# Patient Record
Sex: FEMALE | Race: WHITE | Employment: FULL TIME | URBAN - NONMETROPOLITAN AREA
[De-identification: names, ages, dates, MRNs, and addresses within clinical notes are randomized per-mention and may not be internally consistent; named-entity substitution may affect disease eponyms.]

---

## 2017-08-31 ENCOUNTER — HOSPITAL ENCOUNTER (OUTPATIENT)
Dept: MRI IMAGING | Age: 32
Discharge: HOME | End: 2017-08-31
Payer: COMMERCIAL

## 2017-08-31 DIAGNOSIS — M93.90 OSTEOCHONDROPATHY: ICD-10-CM

## 2017-08-31 PROCEDURE — 73721 MRI JNT OF LWR EXTRE W/O DYE: CPT

## 2017-09-27 RX ORDER — LETROZOLE 2.5 MG/1
2.5 TABLET, FILM COATED ORAL DAILY
Status: ON HOLD | COMMUNITY
End: 2021-01-18

## 2017-10-04 ENCOUNTER — HOSPITAL ENCOUNTER (OUTPATIENT)
Age: 32
Setting detail: OUTPATIENT SURGERY
Discharge: HOME | End: 2017-10-04
Attending: PODIATRIST | Admitting: PODIATRIST
Payer: COMMERCIAL

## 2017-10-04 ENCOUNTER — ANESTHESIA (OUTPATIENT)
Dept: OPERATING ROOM | Age: 32
End: 2017-10-04
Payer: COMMERCIAL

## 2017-10-04 ENCOUNTER — ANESTHESIA EVENT (OUTPATIENT)
Dept: OPERATING ROOM | Age: 32
End: 2017-10-04
Payer: COMMERCIAL

## 2017-10-04 VITALS
OXYGEN SATURATION: 97 % | HEIGHT: 70 IN | SYSTOLIC BLOOD PRESSURE: 108 MMHG | WEIGHT: 160 LBS | TEMPERATURE: 98 F | RESPIRATION RATE: 10 BRPM | BODY MASS INDEX: 22.9 KG/M2 | DIASTOLIC BLOOD PRESSURE: 70 MMHG | HEART RATE: 72 BPM

## 2017-10-04 VITALS — SYSTOLIC BLOOD PRESSURE: 118 MMHG | TEMPERATURE: 98.6 F | DIASTOLIC BLOOD PRESSURE: 71 MMHG | OXYGEN SATURATION: 97 %

## 2017-10-04 LAB — PREGNANCY, URINE: NEGATIVE

## 2017-10-04 PROCEDURE — 3700000001 HC ADD 15 MINUTES (ANESTHESIA): Performed by: PODIATRIST

## 2017-10-04 PROCEDURE — 6370000000 HC RX 637 (ALT 250 FOR IP): Performed by: STUDENT IN AN ORGANIZED HEALTH CARE EDUCATION/TRAINING PROGRAM

## 2017-10-04 PROCEDURE — 6360000002 HC RX W HCPCS: Performed by: NURSE ANESTHETIST, CERTIFIED REGISTERED

## 2017-10-04 PROCEDURE — 6360000002 HC RX W HCPCS: Performed by: ANESTHESIOLOGY

## 2017-10-04 PROCEDURE — 2780000010 HC IMPLANT OTHER: Performed by: PODIATRIST

## 2017-10-04 PROCEDURE — 3700000000 HC ANESTHESIA ATTENDED CARE: Performed by: PODIATRIST

## 2017-10-04 PROCEDURE — 7100000000 HC PACU RECOVERY - FIRST 15 MIN: Performed by: PODIATRIST

## 2017-10-04 PROCEDURE — 7100000010 HC PHASE II RECOVERY - FIRST 15 MIN: Performed by: PODIATRIST

## 2017-10-04 PROCEDURE — 7100000011 HC PHASE II RECOVERY - ADDTL 15 MIN: Performed by: PODIATRIST

## 2017-10-04 PROCEDURE — 81025 URINE PREGNANCY TEST: CPT

## 2017-10-04 PROCEDURE — 2500000003 HC RX 250 WO HCPCS: Performed by: NURSE ANESTHETIST, CERTIFIED REGISTERED

## 2017-10-04 PROCEDURE — 2500000003 HC RX 250 WO HCPCS: Performed by: PODIATRIST

## 2017-10-04 PROCEDURE — 6370000000 HC RX 637 (ALT 250 FOR IP): Performed by: NURSE ANESTHETIST, CERTIFIED REGISTERED

## 2017-10-04 PROCEDURE — 3600000014 HC SURGERY LEVEL 4 ADDTL 15MIN: Performed by: PODIATRIST

## 2017-10-04 PROCEDURE — 3600000004 HC SURGERY LEVEL 4 BASE: Performed by: PODIATRIST

## 2017-10-04 PROCEDURE — 7100000001 HC PACU RECOVERY - ADDTL 15 MIN: Performed by: PODIATRIST

## 2017-10-04 PROCEDURE — 2580000003 HC RX 258: Performed by: NURSE ANESTHETIST, CERTIFIED REGISTERED

## 2017-10-04 PROCEDURE — A6446 CONFORM BAND S W>=3" <5"/YD: HCPCS | Performed by: PODIATRIST

## 2017-10-04 PROCEDURE — A6222 GAUZE <=16 IN NO W/SAL W/O B: HCPCS | Performed by: PODIATRIST

## 2017-10-04 DEVICE — RT3 SURGICAL HEMOSTAT
Type: IMPLANTABLE DEVICE | Site: LEG | Status: FUNCTIONAL
Brand: VITAGEL

## 2017-10-04 RX ORDER — LIDOCAINE HYDROCHLORIDE 20 MG/ML
INJECTION, SOLUTION INFILTRATION; PERINEURAL PRN
Status: DISCONTINUED | OUTPATIENT
Start: 2017-10-04 | End: 2017-10-04 | Stop reason: SDUPTHER

## 2017-10-04 RX ORDER — SCOLOPAMINE TRANSDERMAL SYSTEM 1 MG/1
PATCH, EXTENDED RELEASE TRANSDERMAL PRN
Status: DISCONTINUED | OUTPATIENT
Start: 2017-10-04 | End: 2017-10-04 | Stop reason: SDUPTHER

## 2017-10-04 RX ORDER — ONDANSETRON 2 MG/ML
4 INJECTION INTRAMUSCULAR; INTRAVENOUS EVERY 6 HOURS PRN
Status: DISCONTINUED | OUTPATIENT
Start: 2017-10-04 | End: 2017-10-04 | Stop reason: HOSPADM

## 2017-10-04 RX ORDER — SODIUM CHLORIDE 0.9 % (FLUSH) 0.9 %
10 SYRINGE (ML) INJECTION EVERY 12 HOURS SCHEDULED
Status: DISCONTINUED | OUTPATIENT
Start: 2017-10-04 | End: 2017-10-04 | Stop reason: HOSPADM

## 2017-10-04 RX ORDER — OXYCODONE HYDROCHLORIDE AND ACETAMINOPHEN 5; 325 MG/1; MG/1
1 TABLET ORAL EVERY 4 HOURS PRN
Status: DISCONTINUED | OUTPATIENT
Start: 2017-10-04 | End: 2017-10-04 | Stop reason: HOSPADM

## 2017-10-04 RX ORDER — SODIUM CHLORIDE 0.9 % (FLUSH) 0.9 %
10 SYRINGE (ML) INJECTION PRN
Status: DISCONTINUED | OUTPATIENT
Start: 2017-10-04 | End: 2017-10-04 | Stop reason: HOSPADM

## 2017-10-04 RX ORDER — ACETAMINOPHEN 325 MG/1
650 TABLET ORAL EVERY 4 HOURS PRN
Status: DISCONTINUED | OUTPATIENT
Start: 2017-10-04 | End: 2017-10-04 | Stop reason: HOSPADM

## 2017-10-04 RX ORDER — HYDRALAZINE HYDROCHLORIDE 20 MG/ML
5 INJECTION INTRAMUSCULAR; INTRAVENOUS EVERY 10 MIN PRN
Status: DISCONTINUED | OUTPATIENT
Start: 2017-10-04 | End: 2017-10-04 | Stop reason: HOSPADM

## 2017-10-04 RX ORDER — MEPERIDINE HYDROCHLORIDE 25 MG/ML
12.5 INJECTION INTRAMUSCULAR; INTRAVENOUS; SUBCUTANEOUS EVERY 5 MIN PRN
Status: DISCONTINUED | OUTPATIENT
Start: 2017-10-04 | End: 2017-10-04 | Stop reason: HOSPADM

## 2017-10-04 RX ORDER — ONDANSETRON 2 MG/ML
4 INJECTION INTRAMUSCULAR; INTRAVENOUS
Status: COMPLETED | OUTPATIENT
Start: 2017-10-04 | End: 2017-10-04

## 2017-10-04 RX ORDER — MORPHINE SULFATE 2 MG/ML
2 INJECTION, SOLUTION INTRAMUSCULAR; INTRAVENOUS EVERY 5 MIN PRN
Status: DISCONTINUED | OUTPATIENT
Start: 2017-10-04 | End: 2017-10-04 | Stop reason: HOSPADM

## 2017-10-04 RX ORDER — FENTANYL CITRATE 50 UG/ML
50 INJECTION, SOLUTION INTRAMUSCULAR; INTRAVENOUS EVERY 5 MIN PRN
Status: DISCONTINUED | OUTPATIENT
Start: 2017-10-04 | End: 2017-10-04 | Stop reason: HOSPADM

## 2017-10-04 RX ORDER — DEXAMETHASONE SODIUM PHOSPHATE 4 MG/ML
INJECTION, SOLUTION INTRA-ARTICULAR; INTRALESIONAL; INTRAMUSCULAR; INTRAVENOUS; SOFT TISSUE PRN
Status: DISCONTINUED | OUTPATIENT
Start: 2017-10-04 | End: 2017-10-04 | Stop reason: SDUPTHER

## 2017-10-04 RX ORDER — PROPOFOL 10 MG/ML
INJECTION, EMULSION INTRAVENOUS PRN
Status: DISCONTINUED | OUTPATIENT
Start: 2017-10-04 | End: 2017-10-04 | Stop reason: SDUPTHER

## 2017-10-04 RX ORDER — LABETALOL HYDROCHLORIDE 5 MG/ML
5 INJECTION, SOLUTION INTRAVENOUS EVERY 5 MIN PRN
Status: DISCONTINUED | OUTPATIENT
Start: 2017-10-04 | End: 2017-10-04 | Stop reason: HOSPADM

## 2017-10-04 RX ORDER — SODIUM CHLORIDE 9 MG/ML
INJECTION, SOLUTION INTRAVENOUS CONTINUOUS PRN
Status: DISCONTINUED | OUTPATIENT
Start: 2017-10-04 | End: 2017-10-04 | Stop reason: SDUPTHER

## 2017-10-04 RX ORDER — BUPIVACAINE HYDROCHLORIDE AND EPINEPHRINE 5; 5 MG/ML; UG/ML
INJECTION, SOLUTION EPIDURAL; INTRACAUDAL; PERINEURAL PRN
Status: DISCONTINUED | OUTPATIENT
Start: 2017-10-04 | End: 2017-10-04 | Stop reason: HOSPADM

## 2017-10-04 RX ORDER — DIPHENHYDRAMINE HYDROCHLORIDE 50 MG/ML
12.5 INJECTION INTRAMUSCULAR; INTRAVENOUS
Status: DISCONTINUED | OUTPATIENT
Start: 2017-10-04 | End: 2017-10-04 | Stop reason: HOSPADM

## 2017-10-04 RX ORDER — OXYCODONE HYDROCHLORIDE AND ACETAMINOPHEN 5; 325 MG/1; MG/1
2 TABLET ORAL EVERY 4 HOURS PRN
Status: DISCONTINUED | OUTPATIENT
Start: 2017-10-04 | End: 2017-10-04 | Stop reason: HOSPADM

## 2017-10-04 RX ORDER — FENTANYL CITRATE 50 UG/ML
INJECTION, SOLUTION INTRAMUSCULAR; INTRAVENOUS PRN
Status: DISCONTINUED | OUTPATIENT
Start: 2017-10-04 | End: 2017-10-04 | Stop reason: SDUPTHER

## 2017-10-04 RX ADMIN — LIDOCAINE HYDROCHLORIDE 60 MG: 20 INJECTION, SOLUTION INFILTRATION; PERINEURAL at 07:32

## 2017-10-04 RX ADMIN — DEXAMETHASONE SODIUM PHOSPHATE 8 MG: 4 INJECTION, SOLUTION INTRAMUSCULAR; INTRAVENOUS at 07:32

## 2017-10-04 RX ADMIN — SODIUM CHLORIDE: 9 INJECTION, SOLUTION INTRAVENOUS at 07:30

## 2017-10-04 RX ADMIN — SCOPALAMINE 1 PATCH: 1 PATCH, EXTENDED RELEASE TRANSDERMAL at 07:30

## 2017-10-04 RX ADMIN — OXYCODONE HYDROCHLORIDE AND ACETAMINOPHEN 1 TABLET: 5; 325 TABLET ORAL at 10:01

## 2017-10-04 RX ADMIN — FENTANYL CITRATE 100 MCG: 50 INJECTION INTRAMUSCULAR; INTRAVENOUS at 07:32

## 2017-10-04 RX ADMIN — PROPOFOL 200 MG: 10 INJECTION, EMULSION INTRAVENOUS at 07:32

## 2017-10-04 RX ADMIN — ONDANSETRON 4 MG: 2 INJECTION INTRAMUSCULAR; INTRAVENOUS at 08:59

## 2017-10-04 ASSESSMENT — PULMONARY FUNCTION TESTS
PIF_VALUE: 8
PIF_VALUE: 8
PIF_VALUE: 14
PIF_VALUE: 10
PIF_VALUE: 6
PIF_VALUE: 0
PIF_VALUE: 9
PIF_VALUE: 6
PIF_VALUE: 5
PIF_VALUE: 8
PIF_VALUE: 17
PIF_VALUE: 12
PIF_VALUE: 17
PIF_VALUE: 8
PIF_VALUE: 7
PIF_VALUE: 8
PIF_VALUE: 5
PIF_VALUE: 4
PIF_VALUE: 1
PIF_VALUE: 12
PIF_VALUE: 5
PIF_VALUE: 9
PIF_VALUE: 4
PIF_VALUE: 10
PIF_VALUE: 6
PIF_VALUE: 12
PIF_VALUE: 7
PIF_VALUE: 7
PIF_VALUE: 4
PIF_VALUE: 4
PIF_VALUE: 14
PIF_VALUE: 9
PIF_VALUE: 3
PIF_VALUE: 9
PIF_VALUE: 0
PIF_VALUE: 4
PIF_VALUE: 5
PIF_VALUE: 4
PIF_VALUE: 4
PIF_VALUE: 10
PIF_VALUE: 5
PIF_VALUE: 14
PIF_VALUE: 8
PIF_VALUE: 4
PIF_VALUE: 4
PIF_VALUE: 19
PIF_VALUE: 8
PIF_VALUE: 2
PIF_VALUE: 5
PIF_VALUE: 7
PIF_VALUE: 9
PIF_VALUE: 6
PIF_VALUE: 2
PIF_VALUE: 8
PIF_VALUE: 4
PIF_VALUE: 4
PIF_VALUE: 15
PIF_VALUE: 4
PIF_VALUE: 6
PIF_VALUE: 9
PIF_VALUE: 4
PIF_VALUE: 4
PIF_VALUE: 7
PIF_VALUE: 3
PIF_VALUE: 4
PIF_VALUE: 4
PIF_VALUE: 3
PIF_VALUE: 10
PIF_VALUE: 7
PIF_VALUE: 7
PIF_VALUE: 3
PIF_VALUE: 4
PIF_VALUE: 14
PIF_VALUE: 14
PIF_VALUE: 5
PIF_VALUE: 3
PIF_VALUE: 13
PIF_VALUE: 3
PIF_VALUE: 14
PIF_VALUE: 14
PIF_VALUE: 1
PIF_VALUE: 14
PIF_VALUE: 13
PIF_VALUE: 4
PIF_VALUE: 10
PIF_VALUE: 4
PIF_VALUE: 7
PIF_VALUE: 3
PIF_VALUE: 4
PIF_VALUE: 0
PIF_VALUE: 4
PIF_VALUE: 1
PIF_VALUE: 4
PIF_VALUE: 7
PIF_VALUE: 7
PIF_VALUE: 3
PIF_VALUE: 6
PIF_VALUE: 7

## 2017-10-04 ASSESSMENT — PAIN - FUNCTIONAL ASSESSMENT: PAIN_FUNCTIONAL_ASSESSMENT: 0-10

## 2017-10-04 ASSESSMENT — PAIN DESCRIPTION - DESCRIPTORS: DESCRIPTORS: ACHING;SHARP

## 2017-10-04 ASSESSMENT — PAIN SCALES - GENERAL
PAINLEVEL_OUTOF10: 0
PAINLEVEL_OUTOF10: 6
PAINLEVEL_OUTOF10: 0

## 2017-10-04 NOTE — IP AVS SNAPSHOT
Patient Information     Patient Name KELIN Portillo 1985      SEDATION/ANALGESIA INFORMATION/HOME GOING ADVICE     SEDATION / ANALGESIA INFORMATION / Haritha Lenin 86 have received the sedation/analgesia medication during your visit    Sedation/analgesia is used during short medical procedures under controlled supervision. The medication will produce a strong relaxation. You will be able to hear, speak and follow instructions, but your memory and alertness will be decreased. You will be able to swallow and breathe on your own. During sedation/analgesia your blood pressure, heart and breathing will be watched closely. After the procedure, you may not remember what was said or done. You may have the following effects from the medication. \" Drowsiness, dizziness, sleepiness or confusion. \" Difficulty remembering or delayed reaction times. \" Loss of fine muscle control or difficulty with your balance especially while walking. \" Difficulty focusing or blurred vision. You may not be aware of slight changes in your behavior and/or your reaction time because of the medication used during the procedure. Therefore you should follow these instructions. \" Have someone responsible help you with your care. \" Do not drive for 24 hours. \" Do not operate equipment for 24 hours (lawnmowers, power tools, kitchen accessories, stove). \" Do not drink any alcoholic beverages for a minimum of 24 hours. \" Do not make important personal, legal or business decisions for 24 hours. \" You may experience dizziness or lightheadedness. Move slowly and carefully, do not make sudden position changes. \" Drink extra amounts of fluids today. \" Increase your diet as tolerated (unless you have received specific instructions from your doctor). \" If you feel nauseated, continue with liquids until the nausea is gone. \" Notify your physician if you have not urinated within 8 hours after the procedure.

## 2017-10-04 NOTE — IP AVS SNAPSHOT
Patient Information     Patient Name KELIN Caicedo Crocker Jaydon Bergeron 1985         This is your updated medication list to keep with you all times      TAKE these medications     FEMARA 2.5 MG tablet   Generic drug:  letrozole       OVIDREL 250 MCG/0.5ML Inj   Generic drug:  Choriogonadotropin Jeremy

## 2017-10-04 NOTE — IP AVS SNAPSHOT
After Visit Summary  (Discharge Instructions)    Medication List for Home    Based on the information you provided to us as well as any changes during this visit, the following is your updated medication list.  Compare this with your prescription bottles at home. If you have any questions or concerns, contact your primary care physician's office. Daily Medication List (This medication list can be shared with any healthcare provider who is helping you manage your medications)      These are medications you told us you were taking at home, CONTINUE taking them after you leave the hospital        Last Dose    Next Dose Due AM NOON PM NIGHT    FEMARA 2.5 MG tablet   Generic drug:  letrozole   Take 2.5 mg by mouth daily 3 tabs daily x 7 days following menstrual cycle                                         OVIDREL 250 MCG/0.5ML Inj   Generic drug:  Choriogonadotropin Jeremy   Inject into the skin Day 14 of cycle                                                 Allergies as of 10/4/2017        Reactions    Other     Certain atb      Immunizations as of 10/4/2017     No immunizations on file. Last Vitals          Most Recent Value    Temp  98 °F (36.7 °C)    Pulse  77    Resp  16    BP  103/60         After Visit Summary    This summary was created for you. Thank you for entrusting your care to us. The following information includes details about your hospital/visit stay along with steps you should take to help with your recovery once you leave the hospital.  In this packet, you will find information about the topics listed below:    · Instructions about your medications including a list of your home medications  · A summary of your hospital visit  · Follow-up appointments once you have left the hospital  · Your care plan at home      You may receive a survey regarding the care you received during your stay. Your input is valuable to us.  We encourage you to complete and return your survey in the envelope provided. We hope you will choose us in the future for your healthcare needs. Patient Information     Patient Name KELIN Peter 1985      Care Provided at:     Name Address Phone       [address] [phone]            Your Visit    Here you will find information about your visit, including the reason for your visit. Please take this sheet with you when you visit your doctor or other health care provider in the future. It will help determine the best possible medical care for you at that time. If you have any questions once you leave the hospital, please call the department phone number listed below. Why you were here     Your primary diagnosis was:  Not on File      Visit Information     Date & Time Provider Department Dept. Phone    10/4/2017 Bob Mcmahon [phone]       Follow-up Appointments    Below is a list of your follow-up and future appointments. This may not be a complete list as you may have made appointments directly with providers that we are not aware of or your providers may have made some for you. Please call your providers to confirm appointments. It is important to keep your appointments. Please bring your current insurance card, photo ID, co-pay, and all medication bottles to your appointment. If self-pay, payment is expected at the time of service. Follow-up Information     Follow up with Ulises Yao DPM.    Specialty:  Podiatry    Why:  Keep follow up appointment with Dr. David Bowen as previously scheduled. Contact information:    [address]affi Son[address] Liz  [phone]        Preventive Care        Date Due    HIV screening is recommended for all people regardless of risk factors  aged 15-65 years at least once (lifetime) who have never been HIV tested.  2001 Tetanus Combination Vaccine (1 - Tdap) 5/4/2005    Pap Smear 5/4/2007    Yearly Flu Vaccine (1) 9/1/2017                 Care Plan Once You Return Home    This section includes instructions you will need to follow once you leave the hospital.  Your care team will discuss these with you, so you and those caring for you know how to best care for your health needs at home. This section may also include educational information about certain health topics that may be of help to you. Discharge Instructions       Post Op Instructions    Pt Name: Vikram Poe  Medical Record Number: [id number]  Today's Date: 10/4/2017    GENERAL ANESTHESIA OR SEDATION  1. Do not drive or operate hazardous machinery for 24 hours. 2. Do not make important business or personal decisions for 24 hours. 3. Do not drink alcoholic beverages or use tobacco for 24 hours. ACTIVITY INSTRUCTIONS:   Rest today. Resume light to normal activity tomorrow. You may ambulate as tolerated in your post op shoe/boot. No weight is to be placed on the operative limb. Use crutches, walker, Roll-a-bout as needed. Elevate the operative limb as much as possible to reduce swelling and discomfort for the first 72 hours      DIET INSTRUCTIONS:  Begin with clear liquids. If not nauseated, may increase to a low-fat diet when you desire. Regular diet as tolerated. Other:     MEDICATIONS  Prescription sent with you to be used as directed. Do not drink alcohol or drive while taking these medications. You may experience dizziness or drowsiness with these medications. You may also experience constipation which can be relieved with stool softners or laxatives. You may resume your daily prescription medication schedule unless otherwise specified. If taking 325mg Aspirin or other blood thinners such as Coumadin or Plavix, you may resume tomorrow, unless told otherwise.      WOUND/DRESSING INSTRUCTIONS: you do not sign up before the expiration date, you must request a new code. [employer] Access Code: Kirsten Best  Expires: 12/3/2017  9:22 AM    4. Enter your Social Security Number ([id number]) and Date of Birth (mm/dd/yyyy) as indicated and click Submit. You will be taken to the next sign-up page. 5. Create a [employer] ID. This will be your [employer] login ID and cannot be changed, so think of one that is secure and easy to remember. 6. Create a [employer] password. You can change your password at any time. 7. Enter your Password Reset Question and Answer. This can be used at a later time if you forget your password. 8. Enter your e-mail address. You will receive e-mail notification when new information is available in [address] 9. Click Sign Up. You can now view your medical record. Additional Information  If you have questions, please contact the physician practice where you receive care. Remember, [employer] is NOT to be used for urgent needs. For medical emergencies, dial 911. For questions regarding your [employer] account call [phone]. If you have a clinical question, please call your doctor's office. View your information online  ? Review your current list of  medications, immunization, and allergies. ? Review your future test results online . ? Review your discharge instructions provided by your caregivers at discharge    Certain functionality such as prescription refills, scheduling appointments or sending messages to your provider are not activated if your provider does not use [facility] in his/her office    For questions regarding your [employer] account call [phone]. If you have a clinical question, please call your doctor's office. The information on all pages of the After Visit Summary has been reviewed with me, the patient and/or responsible adult, by my health care provider(s).  I had the opportunity to ask questions regarding this

## 2017-10-04 NOTE — ANESTHESIA POSTPROCEDURE EVALUATION
Department of Anesthesiology  Postprocedure Note    Patient: Rosita Gilliland  MRN: [id number]  YOB: 1985  Date of evaluation: 10/4/2017  Time:  12:35 PM     Procedure Summary     Date Anesthesia Start Anesthesia Stop Room / Location    10/04/17 0730 0918 [facility] OR 01 / [place] OR       Procedure Diagnosis Surgeon Responsible Provider    ANKLE ARTHROSCOPY WITH ANTERIOR MEDIAL DEBRIDEMENT AND INSPECTION OF THE ANKLE ON THE LEFT WITH INJECTION OF GUSTAVO GEL (Left ) (ANTERIOR MEDIAL ANKLE JOINT IMPINGEMENT WITH POSSIBLE CARTILAGINOUS INJURY LEFT ANKLE) CHIRAG Rosa MD          Anesthesia Type: general    Joel Phase I: Joel Score: 10    Joel Phase II: Joel Score: 10    Last vitals:  BP      Temp      Pulse     Resp      SpO2        Anesthesia Post Evaluation   [facility]  POST-ANESTHESIA NOTE       Name:  Rosita Gilliland                                         Age:  33 y.o.   MRN:  [id number]      Last Vitals:  /70  Pulse 72  Temp 98 °F (36.7 °C) (Temporal)   Resp 10  Ht 5' 10\" (1.778 m)  Wt 160 lb (72.6 kg)  SpO2 97%  BMI 22.96 kg/m2  Patient Vitals for the past 4 hrs:   BP Temp Temp src Pulse Resp SpO2   10/04/17 0935 108/70 - - 72 10 97 %   10/04/17 0930 110/68 - - 77 16 97 %   10/04/17 0925 111/77 - - 92 18 100 %   10/04/17 0920 110/74 - - 83 12 98 %   10/04/17 0915 108/61 - - 101 25 96 %   10/04/17 0910 103/60 - - 72 16 96 %   10/04/17 0908 103/60 98 °F (36.7 °C) Temporal 77 16 96 %       Level of Consciousness:  Awake    Respiratory:  Stable    Oxygen Saturation:  Stable    Cardiovascular:  Stable    Hydration:  Adequate    PONV:  Stable    Post-op Pain:  Adequate analgesia    Post-op Assessment:  No apparent anesthetic complications    Additional Follow-Up / Treatment / Comment:  None    Desiree Blanton MD  October 4, 2017   12:35 PM

## 2017-10-04 NOTE — ANESTHESIA PRE PROCEDURE
Department of Anesthesiology  Preprocedure Note       Name:  Ellen Madsen   Age:  33 y.o.  :  1985                                          MRN:  [id number]         Date:  10/4/2017      Surgeon: Lauren Li):  Carla Pedroza DPM    Procedure: Procedure(s):  ANKLE ARTHROSCOPY WITH ANTERIOR MEDIAL DEBRIDEMENT AND INSPECTION OF THE ANKLE ON THE LEFT WITH INJECTION OF GUSTAVO GEL    Medications prior to admission:   Prior to Admission medications    Medication Sig Start Date End Date Taking? Authorizing Provider   letrozole (FEMARA) 2.5 MG tablet Take 2.5 mg by mouth daily 3 tabs daily x 7 days following menstrual cycle   Yes Historical Provider, MD   Choriogonadotropin Jeremy (OVIDREL) 250 MCG/0.5ML INJ Inject into the skin Day 14 of cycle    Historical Provider, MD       Current medications:    Current Facility-Administered Medications   Medication Dose Route Frequency Provider Last Rate Last Dose    sodium chloride flush 0.9 % injection 10 mL  10 mL Intravenous 2 times per day Ken Backers, DPM        sodium chloride flush 0.9 % injection 10 mL  10 mL Intravenous PRN Ekn Backers, DPNALINI        ceFAZolin (ANCEF) 2 g in dextrose 3 % 50 mL IVPB (duplex)  2 g Intravenous On Call to Arti [address], DPM           Allergies: Allergies   Allergen Reactions    Other      Certain atb       Problem List:  There is no problem list on file for this patient.       Past Medical History:        Diagnosis Date    PONV (postoperative nausea and vomiting)        Past Surgical History:        Procedure Laterality Date    NASAL FRACTURE SURGERY         Social History:    Social History   Substance Use Topics    Smoking status: Never Smoker    Smokeless tobacco: Not on file    Alcohol use Yes      Comment: occasionally                                Counseling given: Not Answered      Vital Signs (Current):   Vitals:    17 0941 10/04/17 0634   BP:  108/68   Pulse:  64   Resp:  16   Temp:  36 °C

## 2017-10-04 NOTE — PROGRESS NOTES
1649 To phase 1 recovery via cart. Oropharyngeal intact. Respirations are easy & non-labored. Skin warm and dry. Left lower leg dressing and post op shoe on. Dressing is clean, dry and intact. VS WNL - see flowsheet. Capillary refill < 3 seconds. 6550 Pt arousing to name. Oropharyngeal airway removed. Respirations are easy and non-labored. Pt denies pain or nausea. 5645 Continues to rest quietly in bed with HOB elevated to 30 degrees. Denies pain, nausea or needs at present. 0935 Continues to deny pain or nausea. Skin warm and dry. A & O x 3. Respirations are easy & non-labored. 0940 Moved to phase 2 recovery via cart. 46  to bedside. Pt given fadi mist and short bread cookies. Ice pack applied to back of left knee. Dressing and post op shoe left lower leg dry and intact. Skin warm and dry. Respirations are easy and non-labored. Continues to deny pain but states she is \"slightly nauseated\". Encouraged to take small sips of pop as tolerated. Voiced understanding. 0955 Pt able to eat and drink without problems. When asked about pain left lower ankle pt states it's an achy continuous pain 6/10. At first, pt refuses pain medication. Educated importance of pain control with use of rest, ice and medication. Voiced understanding. Agreeable to oral pain medication. See MAR for details. [address] discharge instructions with patient and spouse. Also given transderm [place] patch instructions. 1024 Dressing at bedside with 's assistance. 1035 Pain improved 3/10. Pt states she is still \"slightly nauseated\" but is able to drink 4 oz of pop and eat 4 cookies without problem. 1040 Skin warm and dry. Respirations are easy & non-labored. Left foot shoe/dressing dry and intact. A & O x 3.  To private vehicle via wheelchair x 1 assist.

## 2017-10-05 NOTE — OP NOTE
[address] [place] [address]                               OPERATIVE REPORT    PATIENT NAME:  Paola Doty                  :             1985  MED REC NO:   [id number]                            ROOM:  ACCOUNT NO:   [de-identified]                            ADMISSION DATE:  10/04/2017  PROVIDER:     Jon Hale    DATE OF PROCEDURE:  10/04/2017    SURGEON:  Jory Olmos D.P.M. ASSISTANT:  Dr. Jon Hale, PGY-1    PREOPERATIVE DIAGNOSES:  Anteromedial ankle joint impingement, left  ankle; contusion of left ankle sequelae. POSTOPERATIVE DIAGNOSES:  Anteromedial ankle joint impingement, left  ankle; contusion of left ankle sequelae; osteochondral lesion to left  ankle. OPERATION PERFORMED:  1. Ankle arthroscopy with anteromedial debridement and inspection of  left ankle joint. 2.  Injection of Vitagel. ANESTHESIA:  General.    HEMOSTASIS:  Anatomic dissection. ESTIMATED BLOOD LOSS:  Less than 50 mL. MATERIALS:  4-0 Prolene. INJECTABLES:  25 mL of 0.5% Marcaine with epinephrine and Vitagel into  the ankle joint. PATHOLOGY/MICROBIOLOGY:  None. COMPLICATIONS:  None. CONDITION:  Good. FINDINGS:  Consistent with diagnosis, osteochondral lesion noted along  the anterior medial aspect of the tibial ankle gutter. INDICATIONS:  The patient is a 36-year-old female with chief complaint  of anterior ankle pain, who is being seen by Dr. Deshaun Baeza and treated  for anterior ankle impingement. At this time, all conservative  options have been exhausted and surgical intervention was deemed  necessary. The procedure was described to the patient and they  expressed understanding of risks, benefits, and possible  complications. Possible complications include bone infection,  continued pain, possible loss of limb, possible loss of life.     OPERATIVE PROCEDURE:  The patient was transported from the  preoperative holding area into the operating room and placed in a  supine position. A pneumatic tourniquet was applied to the left  thigh, and left foot and ankle was prepped and draped in normal  aseptic manner. Attention was then directed towards the left ankle,  careful palpation was performed to identify the ankle joint as well as  the medial and lateral ankle borders. Skin marker was used to place  the incision site medial to the anterior tibial tendon as it crosses  the ankle joint. Another incision was planned over the lateral ankle  gutter with care to avoid the superficial dorsal cutaneous nerve. A  15-blade was then used to make a skin excision over the planned areas. Before making incision, 10 mL of 0.5% Marcaine was injected into the  ankle joint. Following incision, blunt dissection was performed using  hemostat down to the level of the ankle joint. The arthroscope was  then placed into the lateral ankle portal.  Following the insertion of  scope, attention was then directed towards the medial arthroscopic  portal and a shaver was inserted into the anteromedial portal.  The  ankle joint was then closely inspected. Arthroscopy was noted to  have significant amount of synovitis along the medial ankle joint as  well as a loose body which resembled an osteochondral lesion along the  dorsal aspect of the medial ankle gutter. The shaver was then used to  carefully debride the synovitis. A blunt probe was then used to  carefully inspect a particular surface of the tibia as well as talus. No soft cartilaginous defect were noted; however, there was a defect  noted along the dorsal medial aspect of the tibial gutter. Attention  was then directed towards osteochondral lesion, which was then  carefully debrided using the shaver. The shaver was then removed and  a tissue ablator was then used to continue to ablate the inflammatory tissue.   Upon debridement and ablation, ankle joint was then again

## 2017-11-21 ENCOUNTER — HOSPITAL ENCOUNTER (OUTPATIENT)
Dept: PHYSICAL THERAPY | Age: 32
Setting detail: THERAPIES SERIES
Discharge: HOME | End: 2017-11-21
Payer: COMMERCIAL

## 2017-11-21 PROCEDURE — 97161 PT EVAL LOW COMPLEX 20 MIN: CPT

## 2017-11-21 PROCEDURE — 97110 THERAPEUTIC EXERCISES: CPT

## 2017-11-21 ASSESSMENT — PAIN DESCRIPTION - ORIENTATION: ORIENTATION: LEFT

## 2017-11-21 ASSESSMENT — PAIN DESCRIPTION - PAIN TYPE: TYPE: ACUTE PAIN

## 2017-11-21 ASSESSMENT — PAIN DESCRIPTION - DIRECTION: RADIATING_TOWARDS: MEDIAL ANKLE

## 2017-11-21 ASSESSMENT — PAIN DESCRIPTION - LOCATION: LOCATION: ANKLE

## 2017-11-21 ASSESSMENT — PAIN SCALES - GENERAL: PAINLEVEL_OUTOF10: 4

## 2017-11-22 NOTE — PROGRESS NOTES
medications. Subjective:  Chart Reviewed: Yes  Patient assessed for rehabilitation services?: Yes  Comments: Physician follow up 11/30/17  Other (Comment): PT eval and treat, 2-3 times per week for 6-8 weeks     Subjective: Patient sprained her ankle last March, XR negative at that time. In Oct 2017 Dr. [place] Massed the bone. \" Patient sprained her ankle while going for a layup in basketball and rolled her ankle inversion. She still c/o 4/10 pain in L ankle medial mallelous area. She does not have full motion of leg, walking is fine but difficulty with running, able to tolerate about a mile. Pain will occasionally radiate up tibia to shin. 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 Leisure & Hobbies: Running - used to work out 6 days per week, recently had decreased to 2-3 miles at a time. Crossfit/ interval training. Basketball coaching, [address].      Objective    Observation/Palpation  Palpation: L medial ankle   Observation: In standing - no shoes - increased arch height bilaterally, L ankle increased supination and internal rotation of L ankle compared to R  Scar: L ankle medial and lateral malleolus/ talotibial scars - sutures removed, healed well      RLE General AROM: PF 0-55 deg, PF 0-12 deg     LLE General AROM: PF 0-45 deg tight, DF 0-8 deg tight, INV 0-32 deg, EVR 0-33 deg     Strength RLE: WNL    Strength LLE: Exception  Comment: DF 4+/5, PF 4/5, Inv 4/5, EVR 4/5    Overall Sensation Status: Impaired (Some surface numbness over lateral incision)            Ambulation 1  Surface: carpet  Device: No Device  Assistance: Independent  Quality of Gait: Normalized gait mechanics, normal gait speed, mild antalgia LLE early lift off and decreased L heel strike  Distance: 200 ft about clinic      Stairs  # Steps : 8  Stairs Height: 6\"  Rails: None  Device: No Device  Assistance: Independent  Comment: Reciprocal pattern, no antalgia noted      Balance  Single Leg Stance R Leg: 15 (eyes closed )  Single Leg Stance L Leg: 15 (eyes closed, increased postural sway)           Exercises  Exercise 1: Seated gastroc stretch with strap, soleus stretch with knee flexed, 3 way ankle stretch. Exercise 2: Standing gastroc stretch heel hanging off edge of step 3x 30 sec  Exercise 3: Patient demonstrating several movements which are challenging for her - able to kneel on floor and sit back on heels for end range plantarflexion, full deep squat causes tight sharp pain in talocrural region  Exercise 4: Lunge forward x2 reps each LE - denies pain          Activity Tolerance:  Activity Tolerance: Patient Tolerated treatment well    Assessment: Body structures, Functions, Activity limitations: Decreased ROM, Decreased strength, Decreased balance, Decreased high-level IADLs  Assessment: Patient presents with L ankle pain and stiffness consistent with recent arthroscopy 10/4/2017. She has been trying to resume exercise and running 1 mile with medial ankle pain and stiffness. She would benefit from PT to improve ankle stability and flexibility in order to safely resume prior level of activity. Prognosis: Excellent       Patient Education:  Patient Education: Verbally reviewed HEP - 3 way gastroc stretch with strap, soleus stretch with strap.  Standing balance eyes closed    Plan:  Times per week: 2-3x/week  Plan weeks: 8 decreased L ankle pain to less than 2/10 in order to run at prior level  Short term goal 2: Patient will increase L ankle AROM to 0-12 deg DF, 0-55 deg PF, and 0-40 deg Inversion, 0-35 deg eversion in order to jump, squat, lunge at prior level. Short term goal 3: Patient will demonstrate 5/5 ankle strength with single limb heel raise x25 reps no pain. Short term goal 4: Patient will tolerate plyometric assessment such as jump, leaping, single limb hopping all no pain in order to tolerate interval training for exercise. Long term goals  Time Frame for Long term goals : 8 weeks  Long term goal 1: Patient will improve score of Lower Extremity Functional Index to less than 5% impairment (baseline 15% impairment) in order to exercise and run at prior level. Long term goal 2: Patient will tolerate agility tasks such as high knees, ladder drills without increased pain in order to progress running. Long term goal 3: Patient will tolerate running gait analysis on treadmill.           Ryland Morales, PT

## 2017-11-30 ENCOUNTER — HOSPITAL ENCOUNTER (OUTPATIENT)
Dept: PHYSICAL THERAPY | Age: 32
Setting detail: THERAPIES SERIES
Discharge: HOME | End: 2017-11-30
Payer: COMMERCIAL

## 2017-11-30 PROCEDURE — 97110 THERAPEUTIC EXERCISES: CPT

## 2017-11-30 PROCEDURE — 97035 APP MDLTY 1+ULTRASOUND EA 15: CPT

## 2017-11-30 NOTE — PROGRESS NOTES
[facility]  OUTPATIENT PHYSICAL THERAPY  DAILY NOTE  Madhav Miller     Time In: 0356  Time Out: 5553  Minutes: 34  Timed Code Treatment Minutes: 34 Minutes     Date: 2017  Patient Name: Yamileth Gardner,  Gender:  female        CSN: [id number]   : 1985  (33 y.o.)  Referral Date : 17    Referring Practitioner: Ned Hughes DPM      Diagnosis: Other enthesopathy of L foot M77.52, sprain L ankle S93.492D, contusion L ankle (S90.02XS), Osteochondropathy, L ankle and foot Mm93.972  Treatment Diagnosis: L ankle pain, L ankle stiffness, L ankle edema, muscular weakness. Additional Pertinent Hx: S/p Ankle arthroscopy 10/4/2017 with anterior medial debridedment and inspection of ankle on L with mark gel                  General:  PT Visit Information  Onset Date: 17  PT Insurance Information: [employer] - $25 co pay, allowed 20 visits PT  per carolee yr, Aquatic YES, modalities YES, no massage. MUST BE TREATED BY LICENSED PT/ OT ONLY. Total # of Visits Approved: 20  Total # of Visits to Date: 2  Plan of Care/Certification Expiration Date: 18  Progress Note Counter: 1/10 for PN               Subjective:  Chart Reviewed: Yes  Patient assessed for rehabilitation services?: Yes  Comments: Physician follow up 17  Other (Comment): PT eval and treat, 2-3 times per week for 6-8 weeks     Subjective: Patient sprained her ankle last March, XR negative at that time. In Oct 2017 Dr. Alvarado Hodgkin the bone. \" Patient sprained her ankle while going for a layup in basketball and rolled her ankle inversion. She still c/o 4/10 pain in L ankle medial mallelous area. She does not have full motion of leg, walking is fine but difficulty with running, able to tolerate about a mile. Pain will occasionally radiate up tibia to shin.       Pain:  Patient Currently in Pain: Yes         Objective                     Exercises  Exercise 1: Seated gastroc stretch with strap, x 15, soleus stretch with knee flexed,  Exercise 2: Standing gastroc stretch heel hanging off edge of step 3x 30 sec,  solues stretch with foot propped on step  3 x 15 sec     Exercise 3: rockerboard x 15 B, then balcne for 15 sec. BAPS FWB x 15 x 2 directions. Exercise 5: Us to anterior ankle join  1.2  8min continuous. Activity Tolerance:  Activity Tolerance: Patient Tolerated treatment well    Assessment:  Assessment: Pain anterior joint line with Weighted DF,  added US    Prognosis: Excellent       Patient Education:  Patient Education: try walking on treadmill with elevation to check compression of the anterior ankle joint,                       Plan:  Specific instructions for Next Treatment: trial of US due to soreness after exercise. PT ONLY for insurance. Stationary bike/ elliptical warm up. Ankle theraband, standing balance for stabilization. Return to plyometric interval training and running gait analysis on treadmill as tolerated. At initial eval, patient admits to running about 1 mile and performing leaping/jumping tasks. Goals:  Patient goals : Decrease pain in medial ankle, return to running 2-3 miles at a time, other fitness routines    Short term goals  Time Frame for Short term goals: 4 weeks  Short term goal 1: Patient will report decreased L ankle pain to less than 2/10 in order to run at prior level  Short term goal 2: Patient will increase L ankle AROM to 0-12 deg DF, 0-55 deg PF, and 0-40 deg Inversion, 0-35 deg eversion in order to jump, squat, lunge at prior level. Short term goal 3: Patient will demonstrate 5/5 ankle strength with single limb heel raise x25 reps no pain. Short term goal 4: Patient will tolerate plyometric assessment such as jump, leaping, single limb hopping all no pain in order to tolerate interval training for exercise.     Long term goals  Time Frame for Long term goals : 8 weeks  Long term goal 1: Patient will improve score of Lower Extremity Functional Index to less than 5% impairment (baseline 15% impairment) in order to exercise and run at prior level. Long term goal 2: Patient will tolerate agility tasks such as high knees, ladder drills without increased pain in order to progress running. Long term goal 3: Patient will tolerate running gait analysis on treadmill.           Shannon Tom, PT

## 2017-12-04 ENCOUNTER — HOSPITAL ENCOUNTER (OUTPATIENT)
Dept: PHYSICAL THERAPY | Age: 32
Setting detail: THERAPIES SERIES
Discharge: HOME | End: 2017-12-04
Payer: COMMERCIAL

## 2017-12-04 PROCEDURE — 97035 APP MDLTY 1+ULTRASOUND EA 15: CPT

## 2017-12-04 PROCEDURE — 97110 THERAPEUTIC EXERCISES: CPT

## 2017-12-07 ENCOUNTER — HOSPITAL ENCOUNTER (OUTPATIENT)
Dept: PHYSICAL THERAPY | Age: 32
Setting detail: THERAPIES SERIES
Discharge: HOME | End: 2017-12-07
Payer: COMMERCIAL

## 2017-12-07 PROCEDURE — 97110 THERAPEUTIC EXERCISES: CPT

## 2017-12-07 PROCEDURE — 97035 APP MDLTY 1+ULTRASOUND EA 15: CPT

## 2017-12-07 NOTE — PROGRESS NOTES
[facility]  OUTPATIENT PHYSICAL THERAPY  DAILY NOTE  Avinash Hall     Time In: 36  Time Out: 1700  Minutes: 30  Timed Code Treatment Minutes: 30 Minutes     Date: 2017  Patient Name: Kd Hartman,  Gender:  female        CSN: [id number]   : 1985  (33 y.o.)  Referral Date : 17    Referring Practitioner: Rubia Cage DPM      Diagnosis: Other enthesopathy of L foot M77.52, sprain L ankle S93.492D, contusion L ankle (S90.02XS), Osteochondropathy, L ankle and foot Mm93.972  Treatment Diagnosis: L ankle pain, L ankle stiffness, L ankle edema, muscular weakness. Additional Pertinent Hx: S/p Ankle arthroscopy 10/4/2017 with anterior medial debridedment and inspection of ankle on L with mark gel                  General:  PT Visit Information  Onset Date: 17  PT Insurance Information: [employer] - $25 co pay, allowed 20 visits PT  per carolee yr, Aquatic YES, modalities YES, no massage. MUST BE TREATED BY LICENSED PT/ OT ONLY. Total # of Visits Approved: 20  Total # of Visits to Date: 4  Plan of Care/Certification Expiration Date: 18               Subjective:  Chart Reviewed: Yes  Patient assessed for rehabilitation services?: Yes  Comments: Physician follow up 17  Other (Comment): PT eval and treat, 2-3 times per week for 6-8 weeks     Subjective: she has worked out doing burpees, lunges, squats without ankle pain, pain present with running only and along the anterior ankle joint     Pain:  Patient Currently in Pain: Yes         Objective         Exercises  Exercise 1: treadmill walking -- warm up for `3 min,, jog without shoe and with shoes. no pain with walking, but pain present with and wihtout shoes as soon as she intitiated running,   Exercise 2: Standing gastroc stretch heel hanging off edge of step 3x 30 sec,  solues stretch with foot propped on step  3 x 15 sec     Exercise 5: Us to anterior ankle join  1.2  8min continuous.  on stretch, manual stretching  joint mobs in all directions,  pain with anterior glide of TC joint,          Activity Tolerance:  Activity Tolerance: Patient Tolerated treatment well    Assessment: Body structures, Functions, Activity limitations: Decreased ROM, Decreased strength, Decreased balance, Decreased high-level IADLs  Assessment: Pain anterior joint line with Weighted DF with knee flexed, not preesnt with knee extended. ,added joint mobs with moderate pressure today,   Prognosis: Excellent       Patient Education:  Patient Education: contineu gastroc and soleus stretches frequently ,decreased heel stirke time B with running,             Plan:  Plan Comment: continue    Goals:  Patient goals : Decrease pain in medial ankle, return to running 2-3 miles at a time, other fitness routines    Short term goals  Time Frame for Short term goals: 4 weeks  Short term goal 1: Patient will report decreased L ankle pain to less than 2/10 in order to run at prior level  Short term goal 2: Patient will increase L ankle AROM to 0-12 deg DF, 0-55 deg PF, and 0-40 deg Inversion, 0-35 deg eversion in order to jump, squat, lunge at prior level. Short term goal 3: Patient will demonstrate 5/5 ankle strength with single limb heel raise x25 reps no pain. Short term goal 4: Patient will tolerate plyometric assessment such as jump, leaping, single limb hopping all no pain in order to tolerate interval training for exercise. Long term goals  Time Frame for Long term goals : 8 weeks  Long term goal 1: Patient will improve score of Lower Extremity Functional Index to less than 5% impairment (baseline 15% impairment) in order to exercise and run at prior level. Long term goal 2: Patient will tolerate agility tasks such as high knees, ladder drills without increased pain in order to progress running. Long term goal 3: Patient will tolerate running gait analysis on treadmill.           Aman Mendoza PT

## 2017-12-11 ENCOUNTER — HOSPITAL ENCOUNTER (OUTPATIENT)
Dept: PHYSICAL THERAPY | Age: 32
Setting detail: THERAPIES SERIES
Discharge: HOME | End: 2017-12-11
Payer: COMMERCIAL

## 2017-12-11 PROCEDURE — 97110 THERAPEUTIC EXERCISES: CPT

## 2017-12-11 PROCEDURE — 97035 APP MDLTY 1+ULTRASOUND EA 15: CPT

## 2017-12-11 NOTE — PROGRESS NOTES
[facility]  OUTPATIENT PHYSICAL THERAPY  DAILY NOTE  Ayaz Barrett     Time In: 5095  Time Out: 2173  Minutes: 31  Timed Code Treatment Minutes: 31 Minutes     Date: 2017  Patient Name: Timur King,  Gender:  female        CSN: [id number]   : 1985  (33 y.o.)  Referral Date : 17    Referring Practitioner: Janice Lao DPM      Diagnosis: Other enthesopathy of L foot M77.52, sprain L ankle S93.492D, contusion L ankle (S90.02XS), Osteochondropathy, L ankle and foot Mm93.972  Treatment Diagnosis: L ankle pain, L ankle stiffness, L ankle edema, muscular weakness. Additional Pertinent Hx: S/p Ankle arthroscopy 10/4/2017 with anterior medial debridedment and inspection of ankle on L with mark gel                  General:  PT Visit Information  Onset Date: 17  PT Insurance Information: [employer] - $25 co pay, allowed 20 visits PT  per carolee yr, Aquatic YES, modalities YES, no massage. MUST BE TREATED BY LICENSED PT/ OT ONLY.    Total # of Visits Approved: 20  Total # of Visits to Date: 5  Plan of Care/Certification Expiration Date: 18               Subjective:  Chart Reviewed: Yes  Patient assessed for rehabilitation services?: Yes  Comments: Physician follow up none scheduled yet    SHe may call -- he suggested cortisone injection if  no change with PT   Other (Comment): PT eval and treat, 2-3 times per week for 6-8 weeks     Subjective: She has attempte to run for short distance, with anterio joint pain with push off on the left   occassional twinges of anterior joint pain with walking,  releif with US and stretching temporary,       Pain:  Patient Currently in Pain: Yes         Objective                                                      Exercises  Exercise 1: Nu step  5 min,    Exercise 2: Standing gastroc stretch heel hanging off edge of step 3x 30 sec,  solues stretch with foot propped on step  3 x 15 sec     Exercise 3: rockerboard x 15 B, then (baseline 15% impairment) in order to exercise and run at prior level. Long term goal 2: Patient will tolerate agility tasks such as high knees, ladder drills without increased pain in order to progress running. Long term goal 3: Patient will tolerate running gait analysis on treadmill.           Meri Pryor, PT

## 2017-12-14 ENCOUNTER — APPOINTMENT (OUTPATIENT)
Dept: PHYSICAL THERAPY | Age: 32
End: 2017-12-14
Payer: COMMERCIAL

## 2017-12-18 ENCOUNTER — HOSPITAL ENCOUNTER (OUTPATIENT)
Dept: PHYSICAL THERAPY | Age: 32
Setting detail: THERAPIES SERIES
Discharge: HOME | End: 2017-12-18
Payer: COMMERCIAL

## 2017-12-18 PROCEDURE — 97035 APP MDLTY 1+ULTRASOUND EA 15: CPT

## 2017-12-27 ENCOUNTER — APPOINTMENT (OUTPATIENT)
Dept: PHYSICAL THERAPY | Age: 32
End: 2017-12-27
Payer: COMMERCIAL

## 2017-12-28 ENCOUNTER — HOSPITAL ENCOUNTER (OUTPATIENT)
Dept: PHYSICAL THERAPY | Age: 32
Setting detail: THERAPIES SERIES
End: 2017-12-28
Payer: COMMERCIAL

## 2018-01-04 ENCOUNTER — HOSPITAL ENCOUNTER (OUTPATIENT)
Dept: PHYSICAL THERAPY | Age: 33
Setting detail: THERAPIES SERIES
Discharge: HOME | End: 2018-01-04
Payer: COMMERCIAL

## 2018-01-04 PROCEDURE — 97110 THERAPEUTIC EXERCISES: CPT

## 2018-01-04 PROCEDURE — 97035 APP MDLTY 1+ULTRASOUND EA 15: CPT

## 2018-01-18 ENCOUNTER — HOSPITAL ENCOUNTER (OUTPATIENT)
Dept: PHYSICAL THERAPY | Age: 33
Setting detail: THERAPIES SERIES
Discharge: HOME | End: 2018-01-18
Payer: COMMERCIAL

## 2018-01-18 PROCEDURE — 97035 APP MDLTY 1+ULTRASOUND EA 15: CPT

## 2018-01-18 PROCEDURE — 97110 THERAPEUTIC EXERCISES: CPT

## 2019-03-25 ENCOUNTER — HOSPITAL ENCOUNTER (OUTPATIENT)
Age: 34
Discharge: HOME | End: 2019-03-25
Payer: COMMERCIAL

## 2019-03-25 ENCOUNTER — HOSPITAL ENCOUNTER (OUTPATIENT)
Dept: GENERAL RADIOLOGY | Age: 34
Discharge: HOME | End: 2019-03-25
Payer: COMMERCIAL

## 2019-03-25 DIAGNOSIS — M79.652 LEFT THIGH PAIN: ICD-10-CM

## 2019-03-25 PROCEDURE — 73552 X-RAY EXAM OF FEMUR 2/>: CPT

## 2021-01-18 ENCOUNTER — APPOINTMENT (OUTPATIENT)
Dept: LABOR AND DELIVERY | Age: 36
End: 2021-01-18
Payer: COMMERCIAL

## 2021-01-18 ENCOUNTER — HOSPITAL ENCOUNTER (INPATIENT)
Age: 36
LOS: 3 days | Discharge: HOME | End: 2021-01-21
Attending: OBSTETRICS & GYNECOLOGY | Admitting: OBSTETRICS & GYNECOLOGY
Payer: COMMERCIAL

## 2021-01-18 LAB
ABO: NORMAL
AMPHETAMINE+METHAMPHETAMINE URINE SCREEN: NEGATIVE
ANTIBODY SCREEN: NORMAL
BARBITURATE QUANTITATIVE URINE: NEGATIVE
BASOPHILS # BLD: 0.3 %
BASOPHILS ABSOLUTE: 0 THOU/MM3 (ref 0–0.1)
BENZODIAZEPINE QUANTITATIVE URINE: NEGATIVE
CANNABINOID QUANTITATIVE URINE: NEGATIVE
COCAINE METABOLITE QUANTITATIVE URINE: NEGATIVE
EOSINOPHIL # BLD: 0.4 %
EOSINOPHILS ABSOLUTE: 0 THOU/MM3 (ref 0–0.4)
ERYTHROCYTE [DISTWIDTH] IN BLOOD BY AUTOMATED COUNT: 12.9 % (ref 11.5–14.5)
ERYTHROCYTE [DISTWIDTH] IN BLOOD BY AUTOMATED COUNT: 45.5 FL (ref 35–45)
HCT VFR BLD CALC: 39 % (ref 37–47)
HEMOGLOBIN: 13.3 GM/DL (ref 12–16)
IMMATURE GRANS (ABS): 0.1 THOU/MM3 (ref 0–0.07)
IMMATURE GRANULOCYTES: 1 %
LYMPHOCYTES # BLD: 18.6 %
LYMPHOCYTES ABSOLUTE: 1.8 THOU/MM3 (ref 1–4.8)
MCH RBC QN AUTO: 33 PG (ref 26–33)
MCHC RBC AUTO-ENTMCNC: 34.1 GM/DL (ref 32.2–35.5)
MCV RBC AUTO: 96.8 FL (ref 81–99)
MONOCYTES # BLD: 7.3 %
MONOCYTES ABSOLUTE: 0.7 THOU/MM3 (ref 0.4–1.3)
NUCLEATED RED BLOOD CELLS: 0 /100 WBC
OPIATES, URINE: NEGATIVE
OXYCODONE: NEGATIVE
PHENCYCLIDINE QUANTITATIVE URINE: NEGATIVE
PLATELET # BLD: 178 THOU/MM3 (ref 130–400)
PMV BLD AUTO: 11.9 FL (ref 9.4–12.4)
RBC # BLD: 4.03 MILL/MM3 (ref 4.2–5.4)
RH FACTOR: NORMAL
SEG NEUTROPHILS: 72.4 %
SEGMENTED NEUTROPHILS ABSOLUTE COUNT: 7 THOU/MM3 (ref 1.8–7.7)
WBC # BLD: 9.6 THOU/MM3 (ref 4.8–10.8)

## 2021-01-18 PROCEDURE — 86901 BLOOD TYPING SEROLOGIC RH(D): CPT

## 2021-01-18 PROCEDURE — 6360000002 HC RX W HCPCS

## 2021-01-18 PROCEDURE — 85025 COMPLETE CBC W/AUTO DIFF WBC: CPT

## 2021-01-18 PROCEDURE — 1220000001 HC SEMI PRIVATE L&D R&B

## 2021-01-18 PROCEDURE — 36415 COLL VENOUS BLD VENIPUNCTURE: CPT

## 2021-01-18 PROCEDURE — 86592 SYPHILIS TEST NON-TREP QUAL: CPT

## 2021-01-18 PROCEDURE — 6370000000 HC RX 637 (ALT 250 FOR IP): Performed by: OBSTETRICS & GYNECOLOGY

## 2021-01-18 PROCEDURE — 80307 DRUG TEST PRSMV CHEM ANLYZR: CPT

## 2021-01-18 PROCEDURE — 6360000002 HC RX W HCPCS: Performed by: OBSTETRICS & GYNECOLOGY

## 2021-01-18 PROCEDURE — 86850 RBC ANTIBODY SCREEN: CPT

## 2021-01-18 PROCEDURE — 2580000003 HC RX 258: Performed by: OBSTETRICS & GYNECOLOGY

## 2021-01-18 PROCEDURE — 86900 BLOOD TYPING SEROLOGIC ABO: CPT

## 2021-01-18 RX ORDER — DOCUSATE SODIUM 100 MG/1
100 CAPSULE, LIQUID FILLED ORAL 2 TIMES DAILY
Status: DISCONTINUED | OUTPATIENT
Start: 2021-01-18 | End: 2021-01-19

## 2021-01-18 RX ORDER — CARBOPROST TROMETHAMINE 250 UG/ML
250 INJECTION, SOLUTION INTRAMUSCULAR PRN
Status: DISCONTINUED | OUTPATIENT
Start: 2021-01-18 | End: 2021-01-19

## 2021-01-18 RX ORDER — SODIUM CHLORIDE 0.9 % (FLUSH) 0.9 %
10 SYRINGE (ML) INJECTION PRN
Status: DISCONTINUED | OUTPATIENT
Start: 2021-01-18 | End: 2021-01-19

## 2021-01-18 RX ORDER — BUTORPHANOL TARTRATE 1 MG/ML
1 INJECTION, SOLUTION INTRAMUSCULAR; INTRAVENOUS
Status: DISCONTINUED | OUTPATIENT
Start: 2021-01-18 | End: 2021-01-19

## 2021-01-18 RX ORDER — ONDANSETRON 2 MG/ML
8 INJECTION INTRAMUSCULAR; INTRAVENOUS EVERY 8 HOURS PRN
Status: DISCONTINUED | OUTPATIENT
Start: 2021-01-18 | End: 2021-01-19

## 2021-01-18 RX ORDER — METHYLERGONOVINE MALEATE 0.2 MG/ML
200 INJECTION INTRAVENOUS PRN
Status: DISCONTINUED | OUTPATIENT
Start: 2021-01-18 | End: 2021-01-19

## 2021-01-18 RX ORDER — ACETAMINOPHEN 325 MG/1
650 TABLET ORAL EVERY 4 HOURS PRN
Status: DISCONTINUED | OUTPATIENT
Start: 2021-01-18 | End: 2021-01-19

## 2021-01-18 RX ORDER — SODIUM CHLORIDE, SODIUM LACTATE, POTASSIUM CHLORIDE, CALCIUM CHLORIDE 600; 310; 30; 20 MG/100ML; MG/100ML; MG/100ML; MG/100ML
INJECTION, SOLUTION INTRAVENOUS CONTINUOUS
Status: DISCONTINUED | OUTPATIENT
Start: 2021-01-18 | End: 2021-01-19

## 2021-01-18 RX ORDER — ZOLPIDEM TARTRATE 5 MG/1
5 TABLET ORAL NIGHTLY PRN
Status: DISCONTINUED | OUTPATIENT
Start: 2021-01-18 | End: 2021-01-19

## 2021-01-18 RX ORDER — SODIUM CHLORIDE 0.9 % (FLUSH) 0.9 %
10 SYRINGE (ML) INJECTION EVERY 12 HOURS SCHEDULED
Status: DISCONTINUED | OUTPATIENT
Start: 2021-01-18 | End: 2021-01-19

## 2021-01-18 RX ORDER — DIPHENHYDRAMINE HYDROCHLORIDE 50 MG/ML
25 INJECTION INTRAMUSCULAR; INTRAVENOUS EVERY 4 HOURS PRN
Status: DISCONTINUED | OUTPATIENT
Start: 2021-01-18 | End: 2021-01-19

## 2021-01-18 RX ADMIN — ONDANSETRON 8 MG: 2 INJECTION INTRAMUSCULAR; INTRAVENOUS at 22:45

## 2021-01-18 RX ADMIN — SODIUM CHLORIDE, POTASSIUM CHLORIDE, SODIUM LACTATE AND CALCIUM CHLORIDE: 600; 310; 30; 20 INJECTION, SOLUTION INTRAVENOUS at 18:30

## 2021-01-18 RX ADMIN — ZOLPIDEM TARTRATE 5 MG: 5 TABLET ORAL at 21:33

## 2021-01-18 RX ADMIN — Medication 1 MILLI-UNITS/MIN: at 20:13

## 2021-01-18 SDOH — HEALTH STABILITY: MENTAL HEALTH: HOW OFTEN DO YOU HAVE A DRINK CONTAINING ALCOHOL?: NEVER

## 2021-01-18 ASSESSMENT — PAIN DESCRIPTION - DESCRIPTORS: DESCRIPTORS: CRAMPING

## 2021-01-19 ENCOUNTER — ANESTHESIA (OUTPATIENT)
Dept: LABOR AND DELIVERY | Age: 36
End: 2021-01-19
Payer: COMMERCIAL

## 2021-01-19 ENCOUNTER — ANESTHESIA EVENT (OUTPATIENT)
Dept: LABOR AND DELIVERY | Age: 36
End: 2021-01-19
Payer: COMMERCIAL

## 2021-01-19 PROBLEM — Z34.90 SUPERVISION OF NORMAL PREGNANCY: Status: ACTIVE | Noted: 2021-01-19

## 2021-01-19 LAB — RPR: NONREACTIVE

## 2021-01-19 PROCEDURE — 2500000003 HC RX 250 WO HCPCS: Performed by: ANESTHESIOLOGY

## 2021-01-19 PROCEDURE — 7200000001 HC VAGINAL DELIVERY

## 2021-01-19 PROCEDURE — 6370000000 HC RX 637 (ALT 250 FOR IP): Performed by: OBSTETRICS & GYNECOLOGY

## 2021-01-19 PROCEDURE — 0KQM0ZZ REPAIR PERINEUM MUSCLE, OPEN APPROACH: ICD-10-PCS | Performed by: OBSTETRICS & GYNECOLOGY

## 2021-01-19 PROCEDURE — 6360000002 HC RX W HCPCS: Performed by: ANESTHESIOLOGY

## 2021-01-19 PROCEDURE — 3700000025 EPIDURAL BLOCK: Performed by: ANESTHESIOLOGY

## 2021-01-19 PROCEDURE — 6360000002 HC RX W HCPCS: Performed by: OBSTETRICS & GYNECOLOGY

## 2021-01-19 PROCEDURE — 10907ZC DRAINAGE OF AMNIOTIC FLUID, THERAPEUTIC FROM PRODUCTS OF CONCEPTION, VIA NATURAL OR ARTIFICIAL OPENING: ICD-10-PCS | Performed by: OBSTETRICS & GYNECOLOGY

## 2021-01-19 PROCEDURE — 1220000001 HC SEMI PRIVATE L&D R&B

## 2021-01-19 PROCEDURE — 3E033VJ INTRODUCTION OF OTHER HORMONE INTO PERIPHERAL VEIN, PERCUTANEOUS APPROACH: ICD-10-PCS | Performed by: OBSTETRICS & GYNECOLOGY

## 2021-01-19 PROCEDURE — 2580000003 HC RX 258: Performed by: OBSTETRICS & GYNECOLOGY

## 2021-01-19 RX ORDER — CARBOPROST TROMETHAMINE 250 UG/ML
250 INJECTION, SOLUTION INTRAMUSCULAR
Status: ACTIVE | OUTPATIENT
Start: 2021-01-19 | End: 2021-01-19

## 2021-01-19 RX ORDER — IBUPROFEN 800 MG/1
800 TABLET ORAL EVERY 8 HOURS SCHEDULED
Status: DISCONTINUED | OUTPATIENT
Start: 2021-01-20 | End: 2021-01-20 | Stop reason: SDUPTHER

## 2021-01-19 RX ORDER — DIPHENHYDRAMINE HCL 25 MG
25 TABLET ORAL EVERY 6 HOURS PRN
Status: DISCONTINUED | OUTPATIENT
Start: 2021-01-19 | End: 2021-01-19

## 2021-01-19 RX ORDER — ONDANSETRON 2 MG/ML
8 INJECTION INTRAMUSCULAR; INTRAVENOUS EVERY 8 HOURS PRN
Status: DISCONTINUED | OUTPATIENT
Start: 2021-01-19 | End: 2021-01-21 | Stop reason: HOSPADM

## 2021-01-19 RX ORDER — ACETAMINOPHEN 325 MG/1
650 TABLET ORAL EVERY 4 HOURS PRN
Status: DISCONTINUED | OUTPATIENT
Start: 2021-01-19 | End: 2021-01-21 | Stop reason: HOSPADM

## 2021-01-19 RX ORDER — MORPHINE SULFATE 2 MG/ML
2 INJECTION, SOLUTION INTRAMUSCULAR; INTRAVENOUS
Status: DISCONTINUED | OUTPATIENT
Start: 2021-01-19 | End: 2021-01-21 | Stop reason: HOSPADM

## 2021-01-19 RX ORDER — MORPHINE SULFATE 4 MG/ML
4 INJECTION, SOLUTION INTRAMUSCULAR; INTRAVENOUS
Status: DISCONTINUED | OUTPATIENT
Start: 2021-01-19 | End: 2021-01-21 | Stop reason: HOSPADM

## 2021-01-19 RX ORDER — ZOLPIDEM TARTRATE 10 MG/1
10 TABLET ORAL NIGHTLY PRN
Status: DISCONTINUED | OUTPATIENT
Start: 2021-01-20 | End: 2021-01-19

## 2021-01-19 RX ORDER — NALBUPHINE HCL 10 MG/ML
5 AMPUL (ML) INJECTION EVERY 4 HOURS PRN
Status: DISCONTINUED | OUTPATIENT
Start: 2021-01-19 | End: 2021-01-19

## 2021-01-19 RX ORDER — FENTANYL CITRATE 50 UG/ML
INJECTION, SOLUTION INTRAMUSCULAR; INTRAVENOUS PRN
Status: DISCONTINUED | OUTPATIENT
Start: 2021-01-19 | End: 2021-01-19 | Stop reason: SDUPTHER

## 2021-01-19 RX ORDER — ONDANSETRON 4 MG/1
4 TABLET, ORALLY DISINTEGRATING ORAL EVERY 6 HOURS PRN
Status: DISCONTINUED | OUTPATIENT
Start: 2021-01-19 | End: 2021-01-21 | Stop reason: HOSPADM

## 2021-01-19 RX ORDER — SODIUM CHLORIDE 0.9 % (FLUSH) 0.9 %
10 SYRINGE (ML) INJECTION PRN
Status: DISCONTINUED | OUTPATIENT
Start: 2021-01-19 | End: 2021-01-21 | Stop reason: HOSPADM

## 2021-01-19 RX ORDER — IBUPROFEN 800 MG/1
800 TABLET ORAL EVERY 8 HOURS
Status: DISCONTINUED | OUTPATIENT
Start: 2021-01-19 | End: 2021-01-21 | Stop reason: HOSPADM

## 2021-01-19 RX ORDER — NALOXONE HYDROCHLORIDE 0.4 MG/ML
0.4 INJECTION, SOLUTION INTRAMUSCULAR; INTRAVENOUS; SUBCUTANEOUS PRN
Status: DISCONTINUED | OUTPATIENT
Start: 2021-01-19 | End: 2021-01-19

## 2021-01-19 RX ORDER — ONDANSETRON 2 MG/ML
4 INJECTION INTRAMUSCULAR; INTRAVENOUS EVERY 6 HOURS PRN
Status: DISCONTINUED | OUTPATIENT
Start: 2021-01-19 | End: 2021-01-19

## 2021-01-19 RX ORDER — ONDANSETRON 4 MG/1
4 TABLET, ORALLY DISINTEGRATING ORAL EVERY 6 HOURS PRN
Status: DISCONTINUED | OUTPATIENT
Start: 2021-01-19 | End: 2021-01-19

## 2021-01-19 RX ORDER — METHYLERGONOVINE MALEATE 0.2 MG/ML
200 INJECTION INTRAVENOUS PRN
Status: DISCONTINUED | OUTPATIENT
Start: 2021-01-19 | End: 2021-01-21 | Stop reason: HOSPADM

## 2021-01-19 RX ORDER — ZOLPIDEM TARTRATE 10 MG/1
10 TABLET ORAL NIGHTLY PRN
Status: DISCONTINUED | OUTPATIENT
Start: 2021-01-20 | End: 2021-01-21 | Stop reason: HOSPADM

## 2021-01-19 RX ORDER — ROPIVACAINE HYDROCHLORIDE 2 MG/ML
INJECTION, SOLUTION EPIDURAL; INFILTRATION; PERINEURAL PRN
Status: DISCONTINUED | OUTPATIENT
Start: 2021-01-19 | End: 2021-01-19 | Stop reason: SDUPTHER

## 2021-01-19 RX ORDER — SODIUM CHLORIDE 0.9 % (FLUSH) 0.9 %
10 SYRINGE (ML) INJECTION EVERY 12 HOURS SCHEDULED
Status: DISCONTINUED | OUTPATIENT
Start: 2021-01-20 | End: 2021-01-20

## 2021-01-19 RX ORDER — HYDROCODONE BITARTRATE AND ACETAMINOPHEN 5; 325 MG/1; MG/1
2 TABLET ORAL EVERY 4 HOURS PRN
Status: DISCONTINUED | OUTPATIENT
Start: 2021-01-19 | End: 2021-01-21 | Stop reason: HOSPADM

## 2021-01-19 RX ORDER — DIPHENHYDRAMINE HCL 25 MG
25 TABLET ORAL EVERY 6 HOURS PRN
Status: DISCONTINUED | OUTPATIENT
Start: 2021-01-19 | End: 2021-01-21 | Stop reason: HOSPADM

## 2021-01-19 RX ORDER — MODIFIED LANOLIN
OINTMENT (GRAM) TOPICAL PRN
Status: DISCONTINUED | OUTPATIENT
Start: 2021-01-19 | End: 2021-01-21 | Stop reason: HOSPADM

## 2021-01-19 RX ORDER — FERROUS SULFATE 325(65) MG
325 TABLET ORAL
Status: DISCONTINUED | OUTPATIENT
Start: 2021-01-20 | End: 2021-01-21 | Stop reason: HOSPADM

## 2021-01-19 RX ORDER — DOCUSATE SODIUM 100 MG/1
100 CAPSULE, LIQUID FILLED ORAL 2 TIMES DAILY
Status: DISCONTINUED | OUTPATIENT
Start: 2021-01-20 | End: 2021-01-21 | Stop reason: HOSPADM

## 2021-01-19 RX ADMIN — FENTANYL CITRATE 100 MCG: 50 INJECTION, SOLUTION INTRAMUSCULAR; INTRAVENOUS at 19:30

## 2021-01-19 RX ADMIN — Medication 166.7 ML: at 21:27

## 2021-01-19 RX ADMIN — SODIUM CHLORIDE, POTASSIUM CHLORIDE, SODIUM LACTATE AND CALCIUM CHLORIDE: 600; 310; 30; 20 INJECTION, SOLUTION INTRAVENOUS at 19:30

## 2021-01-19 RX ADMIN — SODIUM CHLORIDE, POTASSIUM CHLORIDE, SODIUM LACTATE AND CALCIUM CHLORIDE: 600; 310; 30; 20 INJECTION, SOLUTION INTRAVENOUS at 11:57

## 2021-01-19 RX ADMIN — IBUPROFEN 800 MG: 800 TABLET, FILM COATED ORAL at 22:38

## 2021-01-19 RX ADMIN — SODIUM CHLORIDE, POTASSIUM CHLORIDE, SODIUM LACTATE AND CALCIUM CHLORIDE: 600; 310; 30; 20 INJECTION, SOLUTION INTRAVENOUS at 08:27

## 2021-01-19 RX ADMIN — Medication 18 ML/HR: at 07:42

## 2021-01-19 RX ADMIN — SODIUM CHLORIDE, POTASSIUM CHLORIDE, SODIUM LACTATE AND CALCIUM CHLORIDE: 600; 310; 30; 20 INJECTION, SOLUTION INTRAVENOUS at 01:41

## 2021-01-19 RX ADMIN — BUTORPHANOL TARTRATE 1 MG: 1 INJECTION, SOLUTION INTRAMUSCULAR; INTRAVENOUS at 03:03

## 2021-01-19 RX ADMIN — DIPHENHYDRAMINE HYDROCHLORIDE 25 MG: 50 INJECTION, SOLUTION INTRAMUSCULAR; INTRAVENOUS at 20:07

## 2021-01-19 RX ADMIN — SODIUM CHLORIDE, POTASSIUM CHLORIDE, SODIUM LACTATE AND CALCIUM CHLORIDE: 600; 310; 30; 20 INJECTION, SOLUTION INTRAVENOUS at 07:42

## 2021-01-19 RX ADMIN — ROPIVACAINE HYDROCHLORIDE 8 ML: 2 INJECTION, SOLUTION EPIDURAL; INFILTRATION at 19:30

## 2021-01-19 ASSESSMENT — PAIN DESCRIPTION - DESCRIPTORS: DESCRIPTORS: ACHING

## 2021-01-19 ASSESSMENT — PAIN SCALES - GENERAL: PAINLEVEL_OUTOF10: 5

## 2021-01-19 NOTE — PLAN OF CARE
Problem: Anxiety:  Goal: Level of anxiety will decrease  Description: Level of anxiety will decrease  1/19/2021 0824 by Cynthia Worley RN  Outcome: Ongoing   calm  Problem: Breathing Pattern - Ineffective:  Goal: Able to breathe comfortably  Description: Able to breathe comfortably  1/19/2021 0824 by Cynthia Worley RN  Outcome: Ongoing   Respirations regular and easy  Problem: Fluid Volume - Imbalance:  Goal: Absence of intrapartum hemorrhage signs and symptoms  Description: Absence of intrapartum hemorrhage signs and symptoms  1/19/2021 0824 by Cynthia Worley RN  Outcome: Ongoing   stable  Problem: Infection - Intrapartum Infection:  Goal: Will show no infection signs and symptoms  Description: Will show no infection signs and symptoms  1/19/2021 0824 by Cynthia Worley RN  Outcome: Ongoing   afebrile  Problem: Labor Process - Prolonged:  Goal: Uterine contractions within specified parameters  Description: Uterine contractions within specified parameters  1/19/2021 0824 by Cynthia Worley RN  Outcome: Ongoing   Contractions every 2 min  Problem: Pain - Acute:  Goal: Able to cope with pain  Description: Able to cope with pain  1/19/2021 0824 by Cynthia Worley RN  Outcome: Ongoing   Comfortable with epidural, pain goal 7  Problem: Tissue Perfusion - Uteroplacental, Altered:  Goal: Absence of abnormal fetal heart rate pattern  Description: Absence of abnormal fetal heart rate pattern  1/19/2021 0824 by Cynthia Worley RN  Outcome: Ongoing   Reactive fht's  Problem: Urinary Retention:  Goal: Urinary elimination within specified parameters  Description: Urinary elimination within specified parameters  1/19/2021 0824 by Cynthia Worley RN  Outcome: Ongoing   Bathroom priviliges  Problem: Falls - Risk of:  Goal: Will remain free from falls  Description: Will remain free from falls  1/19/2021 0824 by Cynthia Worley RN  Outcome: Ongoing   No falls  Problem: Discharge

## 2021-01-19 NOTE — FLOWSHEET NOTE
Dr jorge Archuleta in l/d and updated on late decels after epidural, occasional variable decel, moderate variability,4cm

## 2021-01-19 NOTE — FLOWSHEET NOTE
Dr Heather Christopher updated on pt's vag exam. Pt is in hands/knees now, baby not hitting her cervix well per RN.

## 2021-01-19 NOTE — ANESTHESIA PROCEDURE NOTES
Epidural Block    Patient location during procedure: OB  Reason for block: labor epidural  Staffing  Performed: resident/CRNA   Resident/CRNA: JUN Garay CRNA  Preanesthetic Checklist  Completed: patient identified, IV checked, site marked, risks and benefits discussed, surgical consent, monitors and equipment checked, pre-op evaluation, timeout performed, anesthesia consent given, oxygen available and patient being monitored  Epidural  Patient position: sitting  Prep: ChloraPrep  Patient monitoring: frequent blood pressure checks  Approach: midline  Location: lumbar (1-5)  Injection technique: GERBER air  Provider prep: mask and sterile gloves  Needle  Needle type: Tuohy   Needle gauge: 18 G  Needle length: 3.5 in  Needle insertion depth: 6 cm  Catheter type: side hole  Catheter at skin depth: 11 cm  Test dose: negative  Assessment  Hemodynamics: stable  Attempts: 1

## 2021-01-19 NOTE — ANESTHESIA PRE PROCEDURE
Department of Anesthesiology  Preprocedure Note       Name:  Brady White   Age:  30 y.o.  :  1985                                          MRN:  [id number]         Date:  2021      Surgeon: * No surgeons listed *    Procedure: * No procedures listed *    Medications prior to admission:   Prior to Admission medications    Medication Sig Start Date End Date Taking?  Authorizing Provider   Prenatal MV-Min-Fe Fum-FA-DHA (PRENATAL 1 PO) Take by mouth   Yes Historical Provider, MD       Current medications:    Current Facility-Administered Medications   Medication Dose Route Frequency Provider Last Rate Last Admin    oxytocin (PITOCIN) 30 units in 500 mL infusion  1 joanna-units/min Intravenous Continuous Christal Devlin MD 6 mL/hr at 21 0509 6 joanna-units/min at 21 0509    lactated ringers infusion   Intravenous Continuous Christal Devlin  mL/hr at 21 0141 New Bag at 21 0141    sodium chloride flush 0.9 % injection 10 mL  10 mL Intravenous 2 times per day Christal Devlin MD        sodium chloride flush 0.9 % injection 10 mL  10 mL Intravenous PRN Christal Devlin MD        butorphanol (STADOL) injection 1 mg  1 mg Intravenous Q3H PRN Christal Devlin MD   1 mg at 21 0303    ondansetron (ZOFRAN) injection 8 mg  8 mg Intravenous Q8H PRN Christal Devlin MD   8 mg at 21 2245    diphenhydrAMINE (BENADRYL) injection 25 mg  25 mg Intravenous Q4H PRN Christal Devlin MD        oxytocin (PITOCIN) 10 unit bolus from the bag  10 Units Intravenous PRN Christal Devlin MD        And    oxytocin (PITOCIN) 30 units in 500 mL infusion  50 joanna-units/min Intravenous PRN Christal Devlin MD        methylergonovine (METHERGINE) injection 200 mcg  200 mcg Intramuscular PRN Christal Devlin MD        carboprost (HEMABATE) injection 250 mcg  250 mcg Intramuscular PRN Christal Devlin MD  acetaminophen (TYLENOL) tablet 650 mg  650 mg Oral Q4H PRN Brandy Emery MD        witch hazel-glycerin (TUCKS) pad   Topical PRN Brandy Emery MD        benzocaine-menthol (DERMOPLAST) 20-0.5 % spray   Topical PRN Brandy Emery MD        docusate sodium (COLACE) capsule 100 mg  100 mg Oral BID Brandy Emery MD        zolpidem (AMBIEN) tablet 5 mg  5 mg Oral Nightly PRN Brandy Emery MD   5 mg at 01/18/21 4293       Allergies: Allergies   Allergen Reactions    Other      Certain atb       Problem List:  There is no problem list on file for this patient.       Past Medical History:        Diagnosis Date    PONV (postoperative nausea and vomiting)        Past Surgical History:        Procedure Laterality Date    ANKLE ARTHROSCOPY Left 10/04/2017    with anterior medial debridement with mark gel    ANKLE ARTHROSCOPY Left 10/4/2017    ANKLE ARTHROSCOPY WITH ANTERIOR MEDIAL DEBRIDEMENT AND INSPECTION OF THE ANKLE ON THE LEFT WITH INJECTION OF MARK GEL performed by Shyanne Yung DPM at [address] [facility]         Social History:    Social History     Tobacco Use    Smoking status: Never Smoker    Smokeless tobacco: Never Used   Substance Use Topics    Alcohol use: Never     Frequency: Never     Comment: occasionally                                Counseling given: Not Answered      Vital Signs (Current):   Vitals:    01/19/21 0500 01/19/21 0530 01/19/21 0600 01/19/21 0630   BP: (!) 90/52 110/72 120/71 124/77   Pulse: 75 71 79 75   Resp: 18  18    Temp:       TempSrc:       SpO2:       Weight:       Height:                                                  BP Readings from Last 3 Encounters:   01/19/21 124/77   10/04/17 108/70   10/04/17 118/71       NPO Status: Time of last liquid consumption: 1740                        Time of last solid consumption: 1500                        Date of last liquid consumption: 01/18/21 Date of last solid food consumption: 01/18/21    BMI:   Wt Readings from Last 3 Encounters:   01/18/21 205 lb (93 kg)   10/04/17 160 lb (72.6 kg)     Body mass index is 29.41 kg/m². CBC:   Lab Results   Component Value Date    WBC 9.6 01/18/2021    RBC 4.03 01/18/2021    RBC 4.10 06/24/2020    HGB 13.3 01/18/2021    HCT 39.0 01/18/2021    MCV 96.8 01/18/2021    RDW 13.4 06/24/2020     01/18/2021       CMP:   Lab Results   Component Value Date     03/25/2019    K 4.6 03/25/2019     03/25/2019    CO2 27 03/25/2019    BUN 16 03/25/2019    CREATININE 0.7 03/25/2019    GLUCOSE 92 03/25/2019    PROT 7.3 03/25/2019    CALCIUM 9.9 03/25/2019    BILITOT 0.5 03/25/2019    ALKPHOS 41 03/25/2019    AST 22 03/25/2019    ALT 15 03/25/2019       POC Tests: No results for input(s): POCGLU, POCNA, POCK, POCCL, POCBUN, POCHEMO, POCHCT in the last 72 hours. Coags: No results found for: PROTIME, INR, APTT    HCG (If Applicable):   Lab Results   Component Value Date    PREGTESTUR negative 10/04/2017    PREGSERUM NEGATIVE 12/12/2014        ABGs: No results found for: PHART, PO2ART, IFL6QPC, LEQ7UPP, BEART, U3XDHZNA     Type & Screen (If Applicable):  Lab Results   Component Value Date    LABABO A 06/24/2020    [address] POS 01/18/2021       Drug/Infectious Status (If Applicable):  No results found for: HIV, HEPCAB    COVID-19 Screening (If Applicable): No results found for: COVID19      Anesthesia Evaluation  Patient summary reviewed and Nursing notes reviewed   history of anesthetic complications: PONV.   Airway: Mallampati: II  TM distance: >3 FB   Neck ROM: full  Mouth opening: > = 3 FB Dental: normal exam         Pulmonary:Negative Pulmonary ROS                              Cardiovascular:  Exercise tolerance: good (>4 METS),                     Neuro/Psych:   Negative Neuro/Psych ROS              GI/Hepatic/Renal: Neg GI/Hepatic/Renal ROS            Endo/Other: Negative Endo/Other ROS Abdominal:           Vascular: negative vascular ROS. Anesthesia Plan      epidural     ASA 2             Anesthetic plan and risks discussed with patient. Plan discussed with attending.                   Lynne Wise, APRN - CRNA   1/19/2021

## 2021-01-19 NOTE — PLAN OF CARE
Problem: Anxiety:  Goal: Level of anxiety will decrease  Description: Level of anxiety will decrease  Note: Pt remains calm about the birthing experience,  at bedside, supportive. All questions/concerns addressed by RN. Problem: Breathing Pattern - Ineffective:  Goal: Able to breathe comfortably  Description: Able to breathe comfortably  Note: No signs of resp distress noted. Sp02 remains greater than 92% on room air. Respirations equal and unlabored. Problem: Fluid Volume - Imbalance:  Goal: Absence of intrapartum hemorrhage signs and symptoms  Description: Absence of intrapartum hemorrhage signs and symptoms  Note: No vaginal bleeding noted, will continue to monitor. Problem: Infection - Intrapartum Infection:  Goal: Will show no infection signs and symptoms  Description: Will show no infection signs and symptoms  Note: Vitals stable, pt remains afebrile. FHT's remain reassuring, will continue to monitor. Problem: Labor Process - Prolonged:  Goal: Uterine contractions within specified parameters  Description: Uterine contractions within specified parameters  Note: Paula and Pitocin induction started to achieve adequate labor. Problem: Pain - Acute:  Goal: Able to cope with pain  Description: Able to cope with pain  Note: Pt wanting epidural when in active labor, pain goal 7/10       Problem: Tissue Perfusion - Uteroplacental, Altered:  Goal: Absence of abnormal fetal heart rate pattern  Description: Absence of abnormal fetal heart rate pattern  Note: Fetal Heart Tones remain reassuring. Continuous EFM in place.         Problem: Urinary Retention:  Goal: Urinary elimination within specified parameters  Description: Urinary elimination within specified parameters  Note: Pt voiding sufficiently; will continue to montior       Problem: Falls - Risk of:  Goal: Will remain free from falls  Description: Will remain free from falls  Note: Pt to remain from injuries/fall with IV in place, walkway will remain free of clutter. Problem: Discharge Planning:  Goal: Discharged to appropriate level of care  Description: Discharged to appropriate level of care  Note: Pt aware of 2hr recovery period in L&D and then transfer to mom/baby for the remainder of her stay. Care plan reviewed with patient and Lien Louis. Patient and Lien Louis verbalize understanding of the plan of care and contribute to goal setting.

## 2021-01-20 LAB
HCT VFR BLD CALC: 31.7 % (ref 37–47)
HEMOGLOBIN: 10.5 GM/DL (ref 12–16)

## 2021-01-20 PROCEDURE — 1200000000 HC SEMI PRIVATE

## 2021-01-20 PROCEDURE — 85014 HEMATOCRIT: CPT

## 2021-01-20 PROCEDURE — 6370000000 HC RX 637 (ALT 250 FOR IP): Performed by: OBSTETRICS & GYNECOLOGY

## 2021-01-20 PROCEDURE — 36415 COLL VENOUS BLD VENIPUNCTURE: CPT

## 2021-01-20 PROCEDURE — 85018 HEMOGLOBIN: CPT

## 2021-01-20 RX ADMIN — ACETAMINOPHEN 650 MG: 325 TABLET ORAL at 20:47

## 2021-01-20 RX ADMIN — ACETAMINOPHEN 650 MG: 325 TABLET ORAL at 00:45

## 2021-01-20 RX ADMIN — ACETAMINOPHEN 650 MG: 325 TABLET ORAL at 14:06

## 2021-01-20 RX ADMIN — DOCUSATE SODIUM 100 MG: 100 CAPSULE, LIQUID FILLED ORAL at 08:05

## 2021-01-20 RX ADMIN — IBUPROFEN 800 MG: 800 TABLET, FILM COATED ORAL at 20:49

## 2021-01-20 RX ADMIN — IBUPROFEN 800 MG: 800 TABLET, FILM COATED ORAL at 08:05

## 2021-01-20 RX ADMIN — ACETAMINOPHEN 650 MG: 325 TABLET ORAL at 04:38

## 2021-01-20 ASSESSMENT — PAIN SCALES - GENERAL: PAINLEVEL_OUTOF10: 5

## 2021-01-20 NOTE — DISCHARGE SUMMARY
Vaginal Delivery Discharge Summary    Gestational Age:40w0d    Antepartum complications: none    Type of Delivery: Vaginal     Labs: CBC   Lab Results   Component Value Date    HGB 10.5 (L) 01/20/2021    HCT 31.7 (L) 01/20/2021        Intrapartum complications: None    Postpartum complications: none    The patient is ambulating well. The patient is tolerating a normal diet.     Condition: Stable    Discharge Date: 01/21/21    Plan:   Follow up in 5 week(s)    Edgar Watters MD

## 2021-01-20 NOTE — FLOWSHEET NOTE
Pt up to bathroom with assist for second time to void a large amount without difficulty. Moderate amount of rubra lochia. Pt instructed on pam care, voiced understanding. Pt returned to bed and fundus firm and U/U. Tolerated well. Pt denied needs at this time.

## 2021-01-20 NOTE — PLAN OF CARE
Problem: Discharge Planning:  Goal: Discharged to appropriate level of care  Description: Discharged to appropriate level of care  Outcome: Ongoing  Note: Working towards discharge home with family; needs addressed with mother; ducks in a row discussed        Problem: Constipation:  Goal: Bowel elimination is within specified parameters  Description: Bowel elimination is within specified parameters  Outcome: Ongoing  Note: Increasing fluids and ambulation. Problem: Fluid Volume - Imbalance:  Goal: Absence of postpartum hemorrhage signs and symptoms  Description: Absence of postpartum hemorrhage signs and symptoms  Outcome: Ongoing  Note: Small amount of lochia rubra noted. Vitals stable. Problem: Infection - Risk of, Puerperal Infection:  Goal: Will show no infection signs and symptoms  Description: Will show no infection signs and symptoms  Outcome: Ongoing  Note: Vital WNL; no s/sx of infection noted       Problem: Mood - Altered:  Goal: Mood stable  Description: Mood stable  Outcome: Ongoing  Note: Calm and cooperative; bonding well with infant       Problem: Pain - Acute:  Goal: Pain level will decrease  Description: Pain level will decrease  Outcome: Ongoing  Note: Managing pain with Motrin, Tylenol and ice packs; Maintaining pain within pain goal of 5/10 with interventions      Plan of care discussed with mother and she contributes to goal setting and voices understanding of plan of care.

## 2021-01-20 NOTE — FLOWSHEET NOTE
Infant has roomed in with mother this shift except for Mom to rest. Benefits of rooming in discussed.

## 2021-01-20 NOTE — ANESTHESIA POSTPROCEDURE EVALUATION
Department of Anesthesiology  Postprocedure Note    Patient: Damian Schwab  MRN: [id number]  YOB: 1985  Date of evaluation: 1/20/2021  Time:  7:58 AM     Procedure Summary     Date: 01/19/21 Room / Location:     Anesthesia Start: 1826 Anesthesia Stop: 2121    Procedure: Labor Analgesia Diagnosis:     Scheduled Providers:  Responsible Provider: Alejandro Min DO    Anesthesia Type: epidural ASA Status: 2          Anesthesia Type: epidural    Joel Phase I: Joel Score: 9    Joel Phase II: Joel Score: 10    Last vitals: Reviewed and per EMR flowsheets.        Anesthesia Post Evaluation    Patient location during evaluation: floor  Patient participation: complete - patient participated  Level of consciousness: awake and alert  Airway patency: patent  Nausea & Vomiting: no nausea and no vomiting  Complications: no  Cardiovascular status: hemodynamically stable  Respiratory status: acceptable and spontaneous ventilation  Hydration status: euvolemic

## 2021-01-20 NOTE — PLAN OF CARE
Problem: Discharge Planning:  Goal: Discharged to appropriate level of care  Description: Discharged to appropriate level of care  1/20/2021 1018 by Deo Morillo RN  Outcome: Ongoing  Note: On the Maternity Unit for care and medication     Problem: Constipation:  Goal: Bowel elimination is within specified parameters  Description: Bowel elimination is within specified parameters  1/20/2021 1018 by Deo Morillo RN  Outcome: Ongoing  Note: Passing gas, Colace given po     Problem: Fluid Volume - Imbalance:  Goal: Absence of postpartum hemorrhage signs and symptoms  Description: Absence of postpartum hemorrhage signs and symptoms  1/20/2021 1018 by Deo Morillo RN  Outcome: Ongoing  Note: Moderate amount of red lochia fundus is firm and centered     Problem: Infection - Risk of, Puerperal Infection:  Goal: Will show no infection signs and symptoms  Description: Will show no infection signs and symptoms  1/20/2021 1018 by Deo Morillo RN  Outcome: Ongoing  Note: V/S WNL< no untoward s/sr eported     Problem: Mood - Altered:  Goal: Mood stable  Description: Mood stable  1/20/2021 1018 by Deo Morillo RN  Outcome: Ongoing  Note: Pleasant     Problem: Pain - Acute:  Goal: Pain level will decrease  Description: Pain level will decrease  1/20/2021 1018 by Deo Morillo RN  Outcome: Ongoing  Note: Pain level is \"5\", pain goal is \"5\". Patient takes Tylenol ad Motrin for pain, voiced with relief   Care plan reviewed with patient and verbalized understanding. Patient contributed to goal setting.

## 2021-01-20 NOTE — PROGRESS NOTES
Department of Obstetrics and Gynecology  Labor and Delivery  Attending Post Partum Progress Note    SUBJECTIVE:  PPD# 1    OBJECTIVE: no co's     Vitals:  /68   Pulse 61   Temp 97.9 °F (36.6 °C) (Oral)   Resp 16   Ht 5' 10\" (1.778 m)   Wt 205 lb (93 kg)   SpO2 99%   Breastfeeding Unknown   BMI 29.41 kg/m²     ABDOMEN:  normal bowel sounds    DATA:    Hemoglobin:   Lab Results   Component Value Date    HGB 10.5 01/20/2021    HCT 31.7 01/20/2021       ASSESSMENT & PLAN:  Reynaldo Hyatt MD

## 2021-01-20 NOTE — FLOWSHEET NOTE
Pt up to bathroom x1 assist. Pt did own pericare with instruction. Pt to mom/baby via wheelchair and report given to [employer].

## 2021-01-21 VITALS
SYSTOLIC BLOOD PRESSURE: 137 MMHG | HEART RATE: 75 BPM | HEIGHT: 70 IN | BODY MASS INDEX: 29.35 KG/M2 | WEIGHT: 205 LBS | TEMPERATURE: 98.1 F | OXYGEN SATURATION: 99 % | RESPIRATION RATE: 18 BRPM | DIASTOLIC BLOOD PRESSURE: 86 MMHG

## 2021-01-21 PROCEDURE — 90471 IMMUNIZATION ADMIN: CPT | Performed by: OBSTETRICS & GYNECOLOGY

## 2021-01-21 PROCEDURE — 6360000002 HC RX W HCPCS: Performed by: OBSTETRICS & GYNECOLOGY

## 2021-01-21 PROCEDURE — 90715 TDAP VACCINE 7 YRS/> IM: CPT | Performed by: OBSTETRICS & GYNECOLOGY

## 2021-01-21 PROCEDURE — 6370000000 HC RX 637 (ALT 250 FOR IP): Performed by: OBSTETRICS & GYNECOLOGY

## 2021-01-21 RX ADMIN — IBUPROFEN 800 MG: 800 TABLET, FILM COATED ORAL at 20:46

## 2021-01-21 RX ADMIN — TETANUS TOXOID, REDUCED DIPHTHERIA TOXOID AND ACELLULAR PERTUSSIS VACCINE, ADSORBED 0.5 ML: 5; 2.5; 8; 8; 2.5 SUSPENSION INTRAMUSCULAR at 16:29

## 2021-01-21 RX ADMIN — ACETAMINOPHEN 650 MG: 325 TABLET ORAL at 00:41

## 2021-01-21 RX ADMIN — IBUPROFEN 800 MG: 800 TABLET, FILM COATED ORAL at 09:06

## 2021-01-21 RX ADMIN — ACETAMINOPHEN 650 MG: 325 TABLET ORAL at 11:39

## 2021-01-21 ASSESSMENT — PAIN SCALES - GENERAL
PAINLEVEL_OUTOF10: 3
PAINLEVEL_OUTOF10: 1
PAINLEVEL_OUTOF10: 3
PAINLEVEL_OUTOF10: 4

## 2021-01-21 NOTE — LACTATION NOTE
Upon observation possible short lingual frenulum noted. Explained to patient signs and symptoms of improper milk transfer. Discussed proper tongue movement and proper comfort of latch. Educated that [employer] can not diagnose a short lingual frenulum and provided patient with physician handout for further evaluation.

## 2021-01-21 NOTE — FLOWSHEET NOTE
Postpartum education brochure given, teaching complete. [place] postpartum depression screening discussed with patient. Patient instructed to complete [place] postpartum depression screening in 2 weeks and contact her healthcare provider if her score is > 10. Patient voiced understanding. Reviewed postpartum birth warning signs flyer with patient. Patient has voiced understanding of teaching. Discharge teaching and instructions for diagnosis/procedure of vaginal delivey completed with patient using teachback method. AVS reviewed. Patient voiced understanding regarding prescriptions, follow up appointments, and care of self at home. Pt instructed on perineal care and self administration of perineal meds. Pt verbalized understanding and may self medicate. Mother's blood type is A+.  Mom refused offer of Flu Vaccine

## 2021-01-21 NOTE — PLAN OF CARE
Problem: Discharge Planning:  Goal: Discharged to appropriate level of care  Description: Discharged to appropriate level of care  1/21/2021 0954 by Satish Lee RN  Outcome: Ongoing  Note: Home going instructions given to Mom and voiced understanding     Problem: Constipation:  Goal: Bowel elimination is within specified parameters  Description: Bowel elimination is within specified parameters  1/21/2021 0954 by Satish Lee RN  Outcome: Ongoing  Note: Passing gas, refused Colace     Problem: Fluid Volume - Imbalance:  Goal: Absence of postpartum hemorrhage signs and symptoms  Description: Absence of postpartum hemorrhage signs and symptoms  1/21/2021 0954 by Satish Lee RN  Outcome: Ongoing  Note: Small amount of red lochia fundus is firm and centered     Problem: Infection - Risk of, Puerperal Infection:  Goal: Will show no infection signs and symptoms  Description: Will show no infection signs and symptoms  1/21/2021 0954 by Satish Lee RN  Outcome: Ongoing  Note: V/S WNL, no untoward s/s reported     Problem: Mood - Altered:  Goal: Mood stable  Description: Mood stable  1/21/2021 0954 by Satish Lee RN  Outcome: Ongoing  Note: Pleasant     Problem: Pain - Acute:  Goal: Pain level will decrease  Description: Pain level will decrease  1/21/2021 0954 by Satish Lee RN  Outcome: Ongoing  Note: Pain rated at \"3\", pain goal is \"5\". Pt takes Tylenol and Motrin, voiced with relief   Care plan reviewed with patient and verbalized understanding. Patient contributed to goal setting.

## 2021-01-21 NOTE — PLAN OF CARE
Problem: Discharge Planning:  Goal: Discharged to appropriate level of care  Description: Discharged to appropriate level of care  1/20/2021 2132 by Chilo Moseley RN  Outcome: Ongoing  Note: Remains in hospital, discussed possible discharge needs. Problem: Constipation:  Goal: Bowel elimination is within specified parameters  Description: Bowel elimination is within specified parameters  1/20/2021 2132 by Chilo Moseley RN  Outcome: Ongoing  Note: Pt with active bowel sounds and passing gas     Problem: Fluid Volume - Imbalance:  Goal: Absence of postpartum hemorrhage signs and symptoms  Description: Absence of postpartum hemorrhage signs and symptoms  1/20/2021 2132 by Chilo Moseley RN  Outcome: Ongoing  Note: No signs of postpartum hemorrhage  at this time     Problem: Infection - Risk of, Puerperal Infection:  Goal: Will show no infection signs and symptoms  Description: Will show no infection signs and symptoms  1/20/2021 2132 by Chilo Moseley RN  Outcome: Ongoing  Note: No signs of infection at this time      Problem: Mood - Altered:  Goal: Mood stable  Description: Mood stable  1/20/2021 2132 by Chilo Moseley RN  Outcome: Ongoing  Note: Pt appropriate with infant family and this RN     Problem: Pain - Acute:  Goal: Pain level will decrease  Description: Pain level will decrease  1/20/2021 2132 by Chilo Moseley RN  Outcome: Ongoing  Note: Pain controlled with po meds. Discussed ice for perineal pain and/or incisional pain or the use of warm blanket/heating pad for uterine cramps. Pt states her pain goal 5/10 has been met. Care plan reviewed with patient and she contributes to goal setting and voices understanding of plan of care.

## 2021-01-22 NOTE — FLOWSHEET NOTE
Pt discharged to sisters room at this time. Pt verbalized understanding of prior education. Placenta release form signed and placenta given to pt.

## 2022-01-13 ENCOUNTER — HOSPITAL ENCOUNTER (OUTPATIENT)
Dept: ULTRASOUND IMAGING | Age: 37
Discharge: HOME | End: 2022-01-13
Payer: COMMERCIAL

## 2022-01-13 DIAGNOSIS — R10.11 ABDOMINAL PAIN, RIGHT UPPER QUADRANT: ICD-10-CM

## 2022-01-13 PROCEDURE — 76705 ECHO EXAM OF ABDOMEN: CPT

## 2022-04-27 ENCOUNTER — HOSPITAL ENCOUNTER (OUTPATIENT)
Dept: GENERAL RADIOLOGY | Age: 37
Discharge: HOME | End: 2022-04-27
Payer: COMMERCIAL

## 2022-04-27 DIAGNOSIS — R13.10 DYSPHAGIA, UNSPECIFIED TYPE: ICD-10-CM

## 2022-04-27 PROCEDURE — 2500000003 HC RX 250 WO HCPCS: Performed by: FAMILY MEDICINE

## 2022-04-27 PROCEDURE — 74230 X-RAY XM SWLNG FUNCJ C+: CPT

## 2022-04-27 PROCEDURE — 92611 MOTION FLUOROSCOPY/SWALLOW: CPT | Performed by: SPEECH-LANGUAGE PATHOLOGIST

## 2022-04-27 RX ADMIN — BARIUM SULFATE 10 ML: 400 PASTE ORAL at 08:21

## 2022-04-27 RX ADMIN — BARIUM SULFATE 20 ML: 0.81 POWDER, FOR SUSPENSION ORAL at 08:21

## 2022-04-27 NOTE — DISCHARGE SUMMARY
[address] [facility] RADIOLOGY  Modified Barium Swallow    SLP Individual Minutes  Time In: 3990  Time Out: 0830  Minutes: 27  Timed Code Treatment Minutes: 0 Minutes       Date: 2022  Patient Name: Yong Birch      CSN: [id number]   : 1985  (29 y.o.)  Gender: female   Referring Physician:  Dr. Tavia Howell  Diagnosis: dysphagia R13.10  History of Present Illness/Injury: Patient reports she has had weight loss and hair loss since 2021. Reports she had a baby in 2021 and she lost 50 pounds (10 pounds more than her prior baby weight) since then. Reports she had thyroid testing that showed a goiter on the right side. Patient reports that she has had some throat swelling for the past 3-4 weeks. Patient states, \"I swallow things fine, but it feels tight or swollen. \"  Patient reports she feels like she has more mucus/drainage this morning causing her to clear her throat more. Patient denies any history of pneumonia. MBS to assess current swallow function for safest diet recommendation. Patient  has a past medical history of PONV (postoperative nausea and vomiting). Current Diet: Regular with thin liquids    Pain: No pain reported. SUBJECTIVE:  Patient sitting upright in the radiology suite chair throughout testing. Patient cooperative and pleasant. No family present. OBJECTIVE:    Respiratory Status:  Room Air    Behavioral Observation:  Alert and Oriented    PATIENT WAS EVALUATED USING:  Barium: Thin Liquids, Puree, Soft Solids and Coarse Solids    ORAL PREPARATION PHASE:  WFL    ORAL PHASE: Reduced Bolus Control and Uncontrolled Bolus/Diffuse Fall Over Tongue Base     ORAL PHASE DUKE SCORE: (Dysphagia outcome and severity scale)  6 = WFL/Modified Independent - Normal Diet - May have mild oral delay    PHARYNGEAL PHASE:  Impaired: Delayed Swallow     PHARYNGEAL PHASE DUKE SCORE: (Dysphagia outcome and severity scale)  5 = Mild Dysphagia - may need one consistency restricted - May have one or more of the following: Aspiration with thin - cough to clear, Airway penetration midway to the vocal cords with one or more consistency or to the vocal folds with one consistency, but clears spontaneously - Residue in the pharynx clears spontaneously    EVIDENCE FOR LARYNGEAL PENETRATION AND/OR ASPIRATION:  No evidence of aspiration  Inconsistent laryngeal penetration evident with thin liquids via cup and straw, but clears with completion of the swallow. **Note: Patient cleared her throat intermittently throughout this study. PENETRATION-ASPIRATION SCALE (PAS): Thin Liquids: 2 = Material enters the airway, remains above vocal folds, and is ejected from the airway  Puree:  1 = Material does not enter the airway  Soft Solid:  1 = Material does not enter the airway  Hard Solid: 1 = Material does not enter the airway    ESOPHAGEAL PHASE:   Slight occasional residue in the upper esophagus. ATTEMPTED TECHNIQUES:  Small Bolus Size Effective    Straw Effective    Cup Effective    Chin Tuck Not Attempted    Head Turn Not Attempted    Spoon Presentations Not Attempted with liquids    Volitional Cough Not Attempted    Spontaneous Cough Not Attempted           DIAGNOSTIC IMPRESSIONS:  Patient's oral phase of the swallow presents at a level that is modified independent. Patient with decreased bolus control at times, however, no airway invasion noted as a result of it. Spillage of the mixed consistency bolus to the level of the pyriform sinuses preswallow and all other consistencies spill to the level of the valleculae preswallow. Patient presents with mild pharyngeal phase deficits characterized by a mildly delayed swallow resulting in occasional laryngeal penetration with thin liquids, which clears with completion of the swallow. No aspiration evident throughout this study.     Diet Recommendations:  Regular with thin liquids  Strategies:  Full Upright Position, Small Bite/Sip, Limit Distractions and slow rate     EDUCATION:  Learner: Patient  Education:  Reviewed results and recommendations of this evaluation, Reviewed diet and strategies and Reviewed signs, symptoms and risks of aspiration  Evaluation of Education: Verbalizes understanding and Family not present    PLAN:  No further speech therapy services indicated at this time.       Catalina Westfall M.S. [id number] [id number]

## 2022-10-24 ENCOUNTER — HOSPITAL ENCOUNTER (OUTPATIENT)
Dept: ULTRASOUND IMAGING | Age: 37
Discharge: HOME | End: 2022-10-24
Payer: COMMERCIAL

## 2022-10-24 DIAGNOSIS — E04.1 RIGHT THYROID NODULE: ICD-10-CM

## 2022-10-24 PROCEDURE — 76536 US EXAM OF HEAD AND NECK: CPT

## (undated) DEVICE — GOWN,SIRUS,NON REINFRCD,LARGE,SET IN SL: Brand: MEDLINE

## (undated) DEVICE — SUTURE VCRL SZ 3-0 L27IN ABSRB UD FS-2 L19MM 1/2 CIR J423H

## (undated) DEVICE — GLOVE ORANGE PI 7 1/2   MSG9075

## (undated) DEVICE — BANDAGE COMPR W4INXL12FT ESMARCH

## (undated) DEVICE — GAUZE,SPONGE,4"X4",12PLY,STERILE,LF,2'S: Brand: MEDLINE

## (undated) DEVICE — BANDAGE GZ W45INXL4 1 10YD FLUF RL 6 PLY DERMACEA

## (undated) DEVICE — 3M™ WARMING BLANKET, UPPER BODY, 10 PER CASE, 42268: Brand: BAIR HUGGER™

## (undated) DEVICE — IMPREGNATED GAUZE DRESSING: Brand: CUTICERIN 7.5X7.5CM CTN 50

## (undated) DEVICE — Z INACTIVE USE 2660664 SOLUTION IRRIG 3000ML 0.9% SOD CHL USP UROMATIC PLAS CONT

## (undated) DEVICE — DISCONTINUED NO SUB CS PLASMA SEPARATOR VITAPREP

## (undated) DEVICE — INTENDED FOR TISSUE SEPARATION, AND OTHER PROCEDURES THAT REQUIRE A SHARP SURGICAL BLADE TO PUNCTURE OR CUT.: Brand: BARD-PARKER ® CARBON RIB-BACK BLADES

## (undated) DEVICE — SUTURE NONABSORBABLE MONOFILAMENT 4-0 PS-2 18 IN BLU PROLENE 8682H

## (undated) DEVICE — KNEE ARTHROSCOPY V-LF: Brand: MEDLINE INDUSTRIES, INC.

## (undated) DEVICE — [ARTHROSCOPY PUMP,  DO NOT USE IF PACKAGE IS DAMAGED,  KEEP DRY,  KEEP AWAY FROM SUNLIGHT,  PROTECT FROM HEAT AND RADIOACTIVE SOURCES.]: Brand: FLOSTEADY

## (undated) DEVICE — GLOVE ORANGE PI 8   MSG9080

## (undated) DEVICE — GAUZE,SPONGE,8"X4",12PLY,XRAY,STRL,LF: Brand: MEDLINE

## (undated) DEVICE — BANDAGE COMPR W4INXL55YD 1 LAYR COT CLSR DLX E HVY DUTY W

## (undated) DEVICE — TOWEL,OR,DSP,ST,BLUE,DLX,4/PK,20PK/CS: Brand: MEDLINE

## (undated) DEVICE — SET ADMIN PRIMING 7ML L30IN 7.35LB 20 GTT 2ND RLER CLMP

## (undated) DEVICE — OPES ASPIRATING ABLATOR COOL CUT

## (undated) DEVICE — SURE SET SINGLE BASIN-LF: Brand: MEDLINE INDUSTRIES, INC.

## (undated) DEVICE — SPONGE GZ W4XL4IN COT 12 PLY TYP VII WVN C FLD DSGN

## (undated) DEVICE — GLOVE ORANGE PI 7   MSG9070

## (undated) DEVICE — CHLORAPREP 26ML ORANGE

## (undated) DEVICE — [AGGRESSIVE PLUS CUTTER, ARTHROSCOPIC SHAVER BLADE,  DO NOT RESTERILIZE,  DO NOT USE IF PACKAGE IS DAMAGED,  KEEP DRY,  KEEP AWAY FROM SUNLIGHT]: Brand: FORMULA

## (undated) DEVICE — GLOVE SURG SZ 8 L11.77IN FNGR THK9.8MIL STRW LTX POLYMER

## (undated) DEVICE — GOWN,SIRUS,NONRNF,SETINSLV,XL,20/CS: Brand: MEDLINE

## (undated) DEVICE — SOLUTION IV 1000ML 0.9% SOD CHL PH 5 INJ USP VIAFLX PLAS